# Patient Record
Sex: MALE | Race: WHITE | NOT HISPANIC OR LATINO | Employment: OTHER | URBAN - METROPOLITAN AREA
[De-identification: names, ages, dates, MRNs, and addresses within clinical notes are randomized per-mention and may not be internally consistent; named-entity substitution may affect disease eponyms.]

---

## 2017-04-17 ENCOUNTER — OFFICE VISIT (OUTPATIENT)
Dept: FAMILY MEDICINE CLINIC | Facility: CLINIC | Age: 67
End: 2017-04-17

## 2017-04-17 VITALS
WEIGHT: 222 LBS | DIASTOLIC BLOOD PRESSURE: 77 MMHG | BODY MASS INDEX: 27.6 KG/M2 | TEMPERATURE: 98.5 F | HEIGHT: 75 IN | SYSTOLIC BLOOD PRESSURE: 123 MMHG | RESPIRATION RATE: 14 BRPM | HEART RATE: 62 BPM

## 2017-04-17 DIAGNOSIS — G89.29 CHRONIC NONINTRACTABLE HEADACHE, UNSPECIFIED HEADACHE TYPE: Primary | ICD-10-CM

## 2017-04-17 DIAGNOSIS — R51.9 CHRONIC NONINTRACTABLE HEADACHE, UNSPECIFIED HEADACHE TYPE: Primary | ICD-10-CM

## 2017-04-17 PROBLEM — F31.9 BIPOLAR 1 DISORDER (HCC): Status: ACTIVE | Noted: 2017-04-17

## 2017-04-17 PROBLEM — K76.0 FATTY LIVER: Status: ACTIVE | Noted: 2017-04-17

## 2017-04-17 PROCEDURE — 99203 OFFICE O/P NEW LOW 30 MIN: CPT | Performed by: FAMILY MEDICINE

## 2017-04-17 RX ORDER — NICOTINE POLACRILEX 4 MG
LOZENGE BUCCAL
COMMUNITY
End: 2017-07-07

## 2017-04-17 RX ORDER — OMEPRAZOLE 20 MG/1
20 CAPSULE, DELAYED RELEASE ORAL DAILY
COMMUNITY

## 2017-04-17 RX ORDER — BUTALBITAL, ACETAMINOPHEN AND CAFFEINE 50; 325; 40 MG/1; MG/1; MG/1
1-2 TABLET ORAL EVERY 6 HOURS PRN
Qty: 60 TABLET | Refills: 0 | Status: SHIPPED | OUTPATIENT
Start: 2017-04-17 | End: 2017-07-07

## 2017-04-17 RX ORDER — LORATADINE 10 MG/1
CAPSULE, LIQUID FILLED ORAL
COMMUNITY

## 2017-04-17 RX ORDER — RISPERIDONE 4 MG/1
4 TABLET ORAL
COMMUNITY
End: 2017-05-26

## 2017-04-17 RX ORDER — KETOCONAZOLE 20 MG/ML
SHAMPOO TOPICAL 2 TIMES WEEKLY
COMMUNITY

## 2017-04-17 RX ORDER — VENLAFAXINE 75 MG/1
75 TABLET ORAL 2 TIMES DAILY
COMMUNITY
End: 2017-05-26

## 2017-04-17 RX ORDER — LITHIUM CARBONATE 450 MG
450 TABLET, EXTENDED RELEASE ORAL 3 TIMES DAILY
COMMUNITY
End: 2020-12-23 | Stop reason: SDUPTHER

## 2017-04-17 NOTE — PROGRESS NOTES
"Subjective   Sebas Rouse is a 66 y.o. male.     CC: Establishment of Care for Headaches    History of Present Illness     Chief Complaint:   Chief Complaint   Patient presents with   • Headache     [facility]   2-3 TIMES A MONTH   - NEW PATIENT       Sebas Rouse 66 y.o. male who presents today to establish care for Medical Management of headaches  he has a history of   Patient Active Problem List   Diagnosis   • Bipolar 1 disorder   • Fatty liver   .  Since the [facility], he has overall felt well.  he has been compliant with   Current Outpatient Prescriptions:   •  butalbital-acetaminophen-caffeine (FIORICET) -40 MG per tablet, Take 1-2 tablets by mouth Every 6 (Six) Hours As Needed for Headache., Disp: 60 tablet, Rfl: 0  •  dextrose (GLUTOSE) 40 % gel, Take  by mouth Every 1 (One) Hour As Needed for Low Blood Sugar., Disp: , Rfl:   •  ketoconazole (NIZORAL) 2 % shampoo, Apply  topically 2 (Two) Times a Week., Disp: , Rfl:   •  lithium (ESKALITH) 450 MG CR tablet, Take 450 mg by mouth 3 (Three) Times a Day., Disp: , Rfl:   •  Loratadine 10 MG capsule, Take  by mouth., Disp: , Rfl:   •  omeprazole (priLOSEC) 20 MG capsule, Take 20 mg by mouth Daily., Disp: , Rfl:   •  risperiDONE (risperDAL) 4 MG tablet, Take 4 mg by mouth. 1/2 QS, Disp: , Rfl:   •  venlafaxine (EFFEXOR) 75 MG tablet, Take 75 mg by mouth 2 (Two) Times a Day., Disp: , Rfl: .  he denies medication side effects.    He gets 2-3 headaches/month for years. Usually responds to Aleve and Motrin w/o needing to escalate further.   All of the chronic condition(s) listed above are stable w/o issues.    /77  Pulse 62  Temp 98.5 °F (36.9 °C) (Oral)   Resp 14  Ht 75\" (190.5 cm)  Wt 222 lb (101 kg)  BMI 27.75 kg/m2    No results found for this or any previous visit.      The following portions of the patient's history were reviewed and updated as appropriate: allergies, current medications, past family history, " past medical history, past social history, past surgical history and problem list.    Review of Systems   Constitutional: Negative for activity change, chills, fatigue and fever.   Respiratory: Negative for cough and chest tightness.    Cardiovascular: Negative for chest pain and palpitations.   Gastrointestinal: Negative for abdominal pain and nausea.   Endocrine: Negative for cold intolerance and polydipsia.   Neurological: Positive for headaches.   Psychiatric/Behavioral: Negative for behavioral problems and dysphoric mood.   All other systems reviewed and are negative.      Objective   Physical Exam   Constitutional: He appears well-developed and well-nourished.   Neck: Neck supple. No thyromegaly present.   Cardiovascular: Normal rate and regular rhythm.    No murmur heard.  Pulmonary/Chest: Effort normal and breath sounds normal.   Abdominal: Bowel sounds are normal.   Psychiatric: He has a normal mood and affect. His behavior is normal.   Nursing note and vitals reviewed.  Old records reviewed with pt.    Assessment/Plan   Sebas was seen today for headache.    Diagnoses and all orders for this visit:    Chronic nonintractable headache, unspecified headache type  -     butalbital-acetaminophen-caffeine (FIORICET) -40 MG per tablet; Take 1-2 tablets by mouth Every 6 (Six) Hours As Needed for Headache.

## 2017-05-22 DIAGNOSIS — R51.9 HEADACHE, UNSPECIFIED HEADACHE TYPE: Primary | ICD-10-CM

## 2017-05-25 ENCOUNTER — OFFICE VISIT (OUTPATIENT)
Dept: FAMILY MEDICINE CLINIC | Facility: CLINIC | Age: 67
End: 2017-05-25

## 2017-05-25 VITALS
WEIGHT: 220 LBS | BODY MASS INDEX: 27.35 KG/M2 | HEART RATE: 64 BPM | TEMPERATURE: 97.5 F | SYSTOLIC BLOOD PRESSURE: 122 MMHG | HEIGHT: 75 IN | RESPIRATION RATE: 14 BRPM | DIASTOLIC BLOOD PRESSURE: 78 MMHG

## 2017-05-25 DIAGNOSIS — B37.0 ORAL THRUSH: Primary | ICD-10-CM

## 2017-05-25 PROCEDURE — 99213 OFFICE O/P EST LOW 20 MIN: CPT | Performed by: FAMILY MEDICINE

## 2017-05-25 RX ORDER — FLUCONAZOLE 200 MG/1
200 TABLET ORAL ONCE
Qty: 1 TABLET | Refills: 0 | Status: SHIPPED | OUTPATIENT
Start: 2017-05-25 | End: 2017-05-25

## 2017-06-20 ENCOUNTER — OFFICE VISIT (OUTPATIENT)
Dept: FAMILY MEDICINE CLINIC | Facility: CLINIC | Age: 67
End: 2017-06-20

## 2017-06-20 VITALS
HEIGHT: 75 IN | BODY MASS INDEX: 26.61 KG/M2 | WEIGHT: 214 LBS | HEART RATE: 68 BPM | TEMPERATURE: 98.3 F | RESPIRATION RATE: 16 BRPM | DIASTOLIC BLOOD PRESSURE: 72 MMHG | SYSTOLIC BLOOD PRESSURE: 122 MMHG

## 2017-06-20 DIAGNOSIS — B37.0 ORAL THRUSH: Primary | ICD-10-CM

## 2017-06-20 PROCEDURE — 99213 OFFICE O/P EST LOW 20 MIN: CPT | Performed by: FAMILY MEDICINE

## 2017-06-20 RX ORDER — FLUCONAZOLE 200 MG/1
TABLET ORAL
Qty: 4 TABLET | Refills: 0 | Status: SHIPPED | OUTPATIENT
Start: 2017-06-20 | End: 2017-07-07

## 2017-06-20 NOTE — PROGRESS NOTES
"Subjective   Sebas Rouse is a 66 y.o. male.     CC: Thrush    History of Present Illness     Pt returns with c/o recurrent thrush in the mouth. Was treated previously yet reports it's now back.  Started back about 4 days ago and is a littl epainful.    The following portions of the patient's history were reviewed and updated as appropriate: allergies, current medications, past family history, past medical history, past social history, past surgical history and problem list.    Review of Systems   Constitutional: Negative for activity change, chills, fatigue and fever.   HENT:        Thrush   Respiratory: Negative for cough and shortness of breath.    Cardiovascular: Negative for chest pain and palpitations.   Gastrointestinal: Negative for abdominal pain.   Endocrine: Negative for cold intolerance.   Psychiatric/Behavioral: Negative for behavioral problems and dysphoric mood. The patient is not nervous/anxious.      /72  Pulse 68  Temp 98.3 °F (36.8 °C) (Oral)   Resp 16  Ht 75\" (190.5 cm)  Wt 214 lb (97.1 kg)  BMI 26.75 kg/m2    Objective   Physical Exam   Constitutional: He appears well-developed and well-nourished.   HENT:   Mild white plaquing noted   Neck: Neck supple. No thyromegaly present.   Cardiovascular: Normal rate and regular rhythm.    No murmur heard.  Pulmonary/Chest: Effort normal and breath sounds normal.   Abdominal: Bowel sounds are normal.   Psychiatric: He has a normal mood and affect. His behavior is normal.   Nursing note and vitals reviewed.      Assessment/Plan   Sebas was seen today for oral thrush.    Diagnoses and all orders for this visit:    Oral thrush  -     nystatin (MYCOSTATIN) 637774 UNIT/ML suspension; Swish and swallow 5 mL 4 (Four) Times a Day.  -     fluconazole (DIFLUCAN) 200 MG tablet; Take 1 tab qweek x 4 weeks             "

## 2017-07-07 ENCOUNTER — OFFICE VISIT (OUTPATIENT)
Dept: NEUROLOGY | Facility: CLINIC | Age: 67
End: 2017-07-07

## 2017-07-07 VITALS
HEIGHT: 75 IN | WEIGHT: 211 LBS | DIASTOLIC BLOOD PRESSURE: 80 MMHG | SYSTOLIC BLOOD PRESSURE: 143 MMHG | BODY MASS INDEX: 26.24 KG/M2

## 2017-07-07 DIAGNOSIS — R51.9 NONINTRACTABLE EPISODIC HEADACHE, UNSPECIFIED HEADACHE TYPE: Primary | ICD-10-CM

## 2017-07-07 PROCEDURE — 99204 OFFICE O/P NEW MOD 45 MIN: CPT | Performed by: PSYCHIATRY & NEUROLOGY

## 2017-07-07 RX ORDER — VENLAFAXINE 75 MG/1
75 TABLET ORAL 2 TIMES DAILY
COMMUNITY
End: 2018-04-06 | Stop reason: ALTCHOICE

## 2017-07-07 RX ORDER — CHLORAL HYDRATE 500 MG
CAPSULE ORAL
COMMUNITY

## 2017-07-07 RX ORDER — TOPIRAMATE 25 MG/1
25 TABLET ORAL NIGHTLY
Qty: 30 TABLET | Refills: 2 | Status: SHIPPED | OUTPATIENT
Start: 2017-07-07 | End: 2017-10-06 | Stop reason: SDUPTHER

## 2017-07-07 NOTE — PROGRESS NOTES
Subjective:     Patient ID: Sebas Rouse is a 67 y.o. male.    History of Present Illness     The patient is a 67-year-old right-handed gentleman who was seen for further evaluation of headaches.  The patient was seen today in consultation per the request of Dr. Gold.  History was also taken from the patient's daughter.  He's had headaches for decades but is been worse over the past few years he has him on Abilify night where they wake him up he is on CPAP.  Headaches are intermittent and severe.  He will go to bed and if he wakes up the morning they're usually much worse he may go to 3 weeks without the headaches are have the headaches frequently over to 3 week period of time.  They're usually around his eyes and bilateral.  His never had a neurodiagnostic studies.  He may lay down with the headaches.  He's been trying Fioricet unsuccessfully.  He also uses ibuprofen.  Reviewed his medication.  The following portions of the patient's history were reviewed and updated as appropriate: allergies, current medications, past family history, past medical history, past social history, past surgical history and problem list.     Family History   Problem Relation Age of Onset   • Heart disease Other    • Hypertension Other    • Hyperlipidemia Other    • Leukemia Other    • Depression Other    • COPD Other    • Diabetes Other    • Migraines Mother      Active Ambulatory Problems     Diagnosis Date Noted   • Bipolar 1 disorder 04/17/2017   • Fatty liver 04/17/2017   • Nonintractable episodic headache 07/07/2017     Resolved Ambulatory Problems     Diagnosis Date Noted   • No Resolved Ambulatory Problems     Past Medical History:   Diagnosis Date   • Depression    • GERD (gastroesophageal reflux disease)    • H/O complete eye exam 10/2016   • Headache    • Headache, tension-type    • Migraine    • Panic attack    • TB (pulmonary tuberculosis)      Social History     Social History   • Marital status: Single     Spouse name:  N/A   • Number of children: N/A   • Years of education: N/A     Occupational History   • Not on file.     Social History Main Topics   • Smoking status: Former Smoker   • Smokeless tobacco: Never Used   • Alcohol use No   • Drug use: Defer   • Sexual activity: Not on file     Other Topics Concern   • Not on file     Social History Narrative       Current Outpatient Prescriptions:   •  diclofenac (VOLTAREN) 1 % gel gel, Apply 4 g topically 4 (Four) Times a Day As Needed., Disp: , Rfl:   •  ketoconazole (NIZORAL) 2 % shampoo, Apply  topically 2 (Two) Times a Week., Disp: , Rfl:   •  lithium (ESKALITH) 450 MG CR tablet, Take 450 mg by mouth 3 (Three) Times a Day., Disp: , Rfl:   •  Loratadine 10 MG capsule, Take  by mouth., Disp: , Rfl:   •  Omega-3 Fatty Acids (FISH OIL) 1000 MG capsule capsule, Take  by mouth 3 (Three) Times a Day With Meals., Disp: , Rfl:   •  omeprazole (priLOSEC) 20 MG capsule, Take 20 mg by mouth Daily., Disp: , Rfl:   •  venlafaxine (EFFEXOR) 75 MG tablet, Take 75 mg by mouth 2 (Two) Times a Day., Disp: , Rfl:       Review of Systems   Constitutional: Negative.    HENT: Positive for dental problem and hearing loss. Negative for congestion, drooling, ear discharge, ear pain, facial swelling, mouth sores, nosebleeds, postnasal drip, rhinorrhea, sinus pressure, sneezing, sore throat, tinnitus, trouble swallowing and voice change.    Eyes: Negative.    Respiratory: Negative.    Cardiovascular: Negative.    Gastrointestinal: Negative.    Endocrine: Negative.    Musculoskeletal: Negative.    Skin: Negative.    Allergic/Immunologic: Negative.    Neurological: Positive for numbness. Negative for dizziness, tremors, seizures, syncope, facial asymmetry, speech difficulty, weakness, light-headedness and headaches.   Hematological: Negative.    Psychiatric/Behavioral: Negative.         Objective:    Neurologic Exam  Mental status examination showed normal orientation, memory, and speech.  Attention span and  concentration were normal.  Fund of knowledge was normal.  Funduscopic showed no abnormality.  Visual fields were full.  Pupillary reflexes were 5 mm, symmetric, and equally reactive to light.  Eye movements were full and conjugate.  Gag reflex was normal.  Hearing was normal.  Muscles of mastication were normal.  No facial weakness was noted.  Shoulder shrug strength was normal bilaterally.  Tongue protrudes midline.  There is no drift of outstretched arms.  Deep tendon reflexes are 2+ and symmetric.  No focal weakness or atrophy was noted.  Tone was normal in all extremities.  No cerebellar signs were noted.  No abnormal movements were noted.  The patient's gait was normal.  No other pathologic reflexes such as Babinski's sign were noted.  No sensory abnormalities were noted.  Physical Exam    Assessment/Plan:     Sebas was seen today for headache.    Diagnoses and all orders for this visit:    Nonintractable episodic headache, unspecified headache type  -     MRI Brain With & Without Contrast; Future         Etiology of the headache is unclear.  This could be a migraine equivalent although he is a bit old for this he has had the headaches however for a number of years neurologic examination is normal.  However in view of the severity and frequency of the headaches I feel that referable study is warranted.  I set up an MRI of the brain.  In addition I placed him on topiramate 25 mg at night.  He will call in one month.  I plan to see him back the office in 3 months.Thank you for allowing me to share in the care of this patient.  Ozzy Smith M.D.

## 2017-07-18 ENCOUNTER — HOSPITAL ENCOUNTER (OUTPATIENT)
Dept: MRI IMAGING | Facility: HOSPITAL | Age: 67
Discharge: HOME | End: 2017-07-18
Attending: PSYCHIATRY & NEUROLOGY | Admitting: PSYCHIATRY & NEUROLOGY

## 2017-07-18 DIAGNOSIS — R51.9 NONINTRACTABLE EPISODIC HEADACHE, UNSPECIFIED HEADACHE TYPE: ICD-10-CM

## 2017-07-18 LAB — CREAT BLDA-MCNC: 1.2 MG/DL (ref 0.6–1.3)

## 2017-07-18 PROCEDURE — A9577 INJ MULTIHANCE: HCPCS | Performed by: PSYCHIATRY & NEUROLOGY

## 2017-07-18 PROCEDURE — 82565 ASSAY OF CREATININE: CPT

## 2017-07-18 PROCEDURE — 0 GADOBENATE DIMEGLUMINE 529 MG/ML SOLUTION: Performed by: PSYCHIATRY & NEUROLOGY

## 2017-07-18 PROCEDURE — 70553 MRI BRAIN STEM W/O & W/DYE: CPT

## 2017-07-18 RX ADMIN — GADOBENATE DIMEGLUMINE 20 ML: 529 INJECTION, SOLUTION INTRAVENOUS at 11:30

## 2017-07-24 DIAGNOSIS — K11.1 PAROTID HYPERTROPHY: Primary | ICD-10-CM

## 2017-07-31 ENCOUNTER — HOSPITAL ENCOUNTER (OUTPATIENT)
Dept: CT IMAGING | Facility: HOSPITAL | Age: 67
Discharge: HOME | End: 2017-07-31
Attending: PSYCHIATRY & NEUROLOGY | Admitting: PSYCHIATRY & NEUROLOGY

## 2017-07-31 DIAGNOSIS — K11.1 PAROTID HYPERTROPHY: ICD-10-CM

## 2017-07-31 LAB — CREAT BLDA-MCNC: 1.2 MG/DL (ref 0.6–1.3)

## 2017-07-31 PROCEDURE — 82565 ASSAY OF CREATININE: CPT

## 2017-07-31 PROCEDURE — 0 IOPAMIDOL 61 % SOLUTION: Performed by: PSYCHIATRY & NEUROLOGY

## 2017-07-31 PROCEDURE — 70492 CT SFT TSUE NCK W/O & W/DYE: CPT

## 2017-07-31 RX ADMIN — IOPAMIDOL 85 ML: 612 INJECTION, SOLUTION INTRAVENOUS at 07:22

## 2017-08-02 ENCOUNTER — TELEPHONE (OUTPATIENT)
Dept: NEUROLOGY | Facility: CLINIC | Age: 67
End: 2017-08-02

## 2017-08-02 NOTE — TELEPHONE ENCOUNTER
----- Message from Katie Waldrop sent at 8/2/2017 10:35 AM EDT -----  Regarding: FW: Non-Urgent Medical Question  Contact: [phone]      ----- Message -----     From: Sebas Rouse     Sent: 7/8/2017   9:05 PM       To: [facility]  Subject: Non-Urgent Medical Question                      On June 22 I experienced memory loss for a number of hours, I didn't know where I was or anything.  My daughter took me to the hospital and they did a ct scan and kept me overnight.  They could not explain the memory loss and said I didn't have a stroke.  I eventually regained memory of where I lived etc. but not all.      Today I went to a Massage therapist for numb fingers, he checked all nerves and said something else is wrong.  None of the nerves responded as they should have.

## 2017-08-10 DIAGNOSIS — R22.1 NECK MASS: Primary | ICD-10-CM

## 2017-09-13 ENCOUNTER — HOSPITAL ENCOUNTER (OUTPATIENT)
Dept: ONCOLOGY | Facility: CLINIC | Age: 67
Setting detail: INFUSION SERIES
Discharge: HOME | End: 2017-09-13
Attending: INTERNAL MEDICINE | Admitting: INTERNAL MEDICINE

## 2017-09-13 ENCOUNTER — CLINICAL SUPPORT (OUTPATIENT)
Dept: ONCOLOGY | Facility: HOSPITAL | Age: 67
End: 2017-09-13

## 2017-09-13 ENCOUNTER — HOSPITAL ENCOUNTER (OUTPATIENT)
Dept: ONCOLOGY | Facility: HOSPITAL | Age: 67
Discharge: HOME | End: 2017-09-13
Attending: INTERNAL MEDICINE | Admitting: INTERNAL MEDICINE

## 2017-09-13 LAB
ALBUMIN SERPL-MCNC: 4 G/DL (ref 3.5–4.8)
ALBUMIN/GLOB SERPL: 1.3 {RATIO} (ref 1–1.7)
ALP SERPL-CCNC: 56 IU/L (ref 32–91)
ALT SERPL-CCNC: 18 IU/L (ref 17–63)
ANION GAP SERPL CALC-SCNC: 9.9 MMOL/L (ref 10–20)
AST SERPL-CCNC: 19 IU/L (ref 15–41)
BILIRUB SERPL-MCNC: 0.8 MG/DL (ref 0.3–1.2)
BUN SERPL-MCNC: 13 MG/DL (ref 8–20)
BUN/CREAT SERPL: 13 (ref 6.2–20.3)
CALCIUM SERPL-MCNC: 9.1 MG/DL (ref 8.9–10.3)
CHLORIDE SERPL-SCNC: 110 MMOL/L (ref 101–111)
CONV CO2: 24 MMOL/L (ref 22–32)
CONV TOTAL PROTEIN: 7.1 G/DL (ref 6.1–7.9)
CREAT UR-MCNC: 1 MG/DL (ref 0.7–1.2)
GLOBULIN UR ELPH-MCNC: 3.1 G/DL (ref 2.5–3.8)
GLUCOSE SERPL-MCNC: 97 MG/DL (ref 65–99)
LDH SERPL-CCNC: 126 IU/L (ref 98–192)
POTASSIUM SERPL-SCNC: 3.9 MMOL/L (ref 3.6–5.1)
SODIUM SERPL-SCNC: 140 MMOL/L (ref 136–144)

## 2017-09-13 NOTE — PROGRESS NOTES
PATIENTS ONCOLOGY RECORD LOCATED IN [facility]      Subjective     Name:  NEVA GARCIA     Date:  2017  Address:  [address] [place] [address]  Home: [phone]  :  1950 AGE:  67 y.o.        RECORDS OBTAINED:  Patients Oncology Record is located in [facility]

## 2017-09-16 DIAGNOSIS — E06.3 HASHIMOTO'S THYROIDITIS: Primary | ICD-10-CM

## 2017-09-21 ENCOUNTER — HOSPITAL ENCOUNTER (OUTPATIENT)
Dept: ONCOLOGY | Facility: HOSPITAL | Age: 67
Discharge: HOME | End: 2017-09-21
Attending: INTERNAL MEDICINE | Admitting: INTERNAL MEDICINE

## 2017-10-02 ENCOUNTER — CLINICAL SUPPORT (OUTPATIENT)
Dept: ONCOLOGY | Facility: HOSPITAL | Age: 67
End: 2017-10-02

## 2017-10-02 ENCOUNTER — HOSPITAL ENCOUNTER (OUTPATIENT)
Dept: ONCOLOGY | Facility: CLINIC | Age: 67
Setting detail: INFUSION SERIES
Discharge: HOME | End: 2017-10-02
Attending: INTERNAL MEDICINE | Admitting: INTERNAL MEDICINE

## 2017-10-02 ENCOUNTER — HOSPITAL ENCOUNTER (OUTPATIENT)
Dept: ONCOLOGY | Facility: HOSPITAL | Age: 67
Discharge: HOME | End: 2017-10-02
Attending: INTERNAL MEDICINE | Admitting: INTERNAL MEDICINE

## 2017-10-02 NOTE — PROGRESS NOTES
PATIENTS ONCOLOGY RECORD LOCATED IN [facility]      Subjective     Name:  NEVA GARCIA     Date:  10/02/2017  Address:  [address] [place] [address]  Home: [phone]  :  1950 AGE:  67 y.o.        RECORDS OBTAINED:  Patients Oncology Record is located in [facility]

## 2017-10-06 ENCOUNTER — OFFICE VISIT (OUTPATIENT)
Dept: NEUROLOGY | Facility: CLINIC | Age: 67
End: 2017-10-06

## 2017-10-06 VITALS
DIASTOLIC BLOOD PRESSURE: 70 MMHG | WEIGHT: 207 LBS | SYSTOLIC BLOOD PRESSURE: 102 MMHG | BODY MASS INDEX: 25.74 KG/M2 | HEIGHT: 75 IN

## 2017-10-06 DIAGNOSIS — G56.22 ULNAR NERVE COMPRESSION, LEFT: Primary | ICD-10-CM

## 2017-10-06 DIAGNOSIS — R51.9 NONINTRACTABLE EPISODIC HEADACHE, UNSPECIFIED HEADACHE TYPE: ICD-10-CM

## 2017-10-06 PROCEDURE — 99214 OFFICE O/P EST MOD 30 MIN: CPT | Performed by: PSYCHIATRY & NEUROLOGY

## 2017-10-06 RX ORDER — TOPIRAMATE 50 MG/1
50 TABLET, FILM COATED ORAL NIGHTLY
Qty: 30 TABLET | Refills: 11 | Status: SHIPPED | OUTPATIENT
Start: 2017-10-06 | End: 2018-04-06 | Stop reason: SDUPTHER

## 2017-10-06 RX ORDER — ENALAPRIL MALEATE 20 MG/1
20 TABLET ORAL DAILY
COMMUNITY
End: 2017-10-06

## 2017-10-06 RX ORDER — BUTALBITAL, ACETAMINOPHEN, CAFFEINE AND CODEINE PHOSPHATE 50; 325; 40; 30 MG/1; MG/1; MG/1; MG/1
1 CAPSULE ORAL EVERY 4 HOURS PRN
Qty: 30 CAPSULE | Refills: 1 | Status: SHIPPED | OUTPATIENT
Start: 2017-10-06 | End: 2018-04-06 | Stop reason: SDUPTHER

## 2017-10-06 NOTE — PROGRESS NOTES
Subjective:     Patient ID: Sebas Rouse is a 67 y.o. male.    History of Present Illness     Patient was seen back for follow-up of headaches.  He also has a new problem of numbness in his left hand.  This involves the ring and small finger.  This is been going on for a few months and has some weakness in the hand accompanying this.  He has not had any tests for this.  His headaches have improved but they are still problem.  He is tolerating Topamax 25 mg at night.  Butalbital work better than ibuprofen with the headaches would not relent.  History was also taken from the patient's daughter.  The following portions of the patient's history were reviewed and updated as appropriate: allergies, current medications, past family history, past medical history, past social history, past surgical history and problem list.      Current Outpatient Prescriptions:   •  diclofenac (VOLTAREN) 1 % gel gel, Apply 4 g topically 4 (Four) Times a Day As Needed., Disp: , Rfl:   •  ketoconazole (NIZORAL) 2 % shampoo, Apply  topically 2 (Two) Times a Week., Disp: , Rfl:   •  lithium (ESKALITH) 450 MG CR tablet, Take 450 mg by mouth 3 (Three) Times a Day., Disp: , Rfl:   •  Loratadine 10 MG capsule, Take  by mouth., Disp: , Rfl:   •  Omega-3 Fatty Acids (FISH OIL) 1000 MG capsule capsule, Take  by mouth 3 (Three) Times a Day With Meals., Disp: , Rfl:   •  omeprazole (priLOSEC) 20 MG capsule, Take 20 mg by mouth Daily., Disp: , Rfl:   •  rivaroxaban (XARELTO) 20 MG tablet, Take 20 mg by mouth Daily., Disp: , Rfl:   •  topiramate (TOPAMAX) 50 MG tablet, Take 1 tablet by mouth Every Night., Disp: 30 tablet, Rfl: 11  •  venlafaxine (EFFEXOR) 75 MG tablet, Take 75 mg by mouth 2 (Two) Times a Day., Disp: , Rfl:   •  butalbital-acetaminophen-caffeine-codeine (FIORICET/CODEINE) -44-30 MG per capsule, Take 1 capsule by mouth Every 4 (Four) Hours As Needed for Headache., Disp: 30 capsule, Rfl: 1    Review of Systems   Constitutional:  Negative.    Neurological: Positive for numbness and headaches. Negative for dizziness, tremors, seizures, syncope, facial asymmetry, speech difficulty, weakness and light-headedness.   Psychiatric/Behavioral: Negative.         Objective:    Neurologic Exam  Mental status was appropriate for age.  Fundoscopy showed no papilledema. Visual fields were full to OKN.  Eye movements were full and conjugate.  Pupillary reflexes were mid-range and symmetric.  No facial weakness was noted.  Tongue was midline.  There was no pattern of focal weakness.  Gait was appropriate for age.  No pathologic reflexes were noted.  No cerebellar signs were noted.  Tone was normal.  Deep tendon reflexes were 2+ and symmetric.  There was no atrophy in the ulnar distribution.  Physical Exam    Assessment/Plan:     Sebas was seen today for headache.    Diagnoses and all orders for this visit:    Ulnar nerve compression, left  -     EMG; Future    Nonintractable episodic headache, unspecified headache type    Other orders  -     topiramate (TOPAMAX) 50 MG tablet; Take 1 tablet by mouth Every Night.  -     butalbital-acetaminophen-caffeine-codeine (FIORICET/CODEINE) -49-30 MG per capsule; Take 1 capsule by mouth Every 4 (Four) Hours As Needed for Headache.       He likely has ulnar nerve compression at the elbow on the left.  I set up EMG and nerve conduction studies to be done at [facility].  His headaches are also not totally controlled.  Therefore I'll increase Topamax and restart him on sparing doses of Fioricet    Prescription drug management - meds as above.    Follow-up in the office in 6 months.  Will talk with him at the time of the EMG.  Thank you for allowing me to share in the care of this patient.  Ozzy Smith M.D.

## 2017-11-03 ENCOUNTER — HOSPITAL ENCOUNTER (OUTPATIENT)
Dept: NEUROLOGY | Facility: HOSPITAL | Age: 67
Discharge: HOME | End: 2017-11-03
Attending: PSYCHIATRY & NEUROLOGY | Admitting: PSYCHIATRY & NEUROLOGY

## 2017-11-03 PROCEDURE — 95908 NRV CNDJ TST 3-4 STUDIES: CPT | Performed by: PSYCHIATRY & NEUROLOGY

## 2017-11-06 ENCOUNTER — TELEPHONE (OUTPATIENT)
Dept: NEUROLOGY | Facility: CLINIC | Age: 67
End: 2017-11-06

## 2017-11-06 ENCOUNTER — OUTSIDE FACILITY SERVICE (OUTPATIENT)
Dept: NEUROLOGY | Facility: CLINIC | Age: 67
End: 2017-11-06

## 2017-11-06 NOTE — TELEPHONE ENCOUNTER
"----- Message from Grzegorz Walters sent at 11/6/2017  9:38 AM EST -----  Contact: [phone]  Pt called wanting his test results from being \"shocked\" I am assuming it was an EMG? He stated that Dr Smith told him he would call him back and the pt is still waiting.  Please Advice.   "

## 2017-11-06 NOTE — TELEPHONE ENCOUNTER
EMG was done Friday.  I asked the patient to call me back on Monday for the next step.  I recommend that he be seen by a hand surgeon for the problem with the ulnar nerve at his elbow.  Would recommend [facility]

## 2018-04-06 ENCOUNTER — OFFICE VISIT (OUTPATIENT)
Dept: NEUROLOGY | Facility: CLINIC | Age: 68
End: 2018-04-06

## 2018-04-06 VITALS
BODY MASS INDEX: 27.98 KG/M2 | WEIGHT: 225 LBS | DIASTOLIC BLOOD PRESSURE: 80 MMHG | SYSTOLIC BLOOD PRESSURE: 120 MMHG | HEIGHT: 75 IN

## 2018-04-06 DIAGNOSIS — G56.22 ULNAR NERVE COMPRESSION, LEFT: ICD-10-CM

## 2018-04-06 DIAGNOSIS — R51.9 NONINTRACTABLE EPISODIC HEADACHE, UNSPECIFIED HEADACHE TYPE: Primary | ICD-10-CM

## 2018-04-06 PROCEDURE — 99213 OFFICE O/P EST LOW 20 MIN: CPT | Performed by: PSYCHIATRY & NEUROLOGY

## 2018-04-06 RX ORDER — BUPROPION HYDROCHLORIDE 150 MG/1
300 TABLET ORAL
COMMUNITY
Start: 2018-01-24 | End: 2020-12-23 | Stop reason: SDUPTHER

## 2018-04-06 RX ORDER — TRIAMCINOLONE ACETONIDE 1 MG/G
CREAM TOPICAL
COMMUNITY
Start: 2018-02-11

## 2018-04-06 RX ORDER — TOPIRAMATE 50 MG/1
50 TABLET, FILM COATED ORAL NIGHTLY
Qty: 30 TABLET | Refills: 11 | Status: SHIPPED | OUTPATIENT
Start: 2018-04-06 | End: 2018-10-05 | Stop reason: SDUPTHER

## 2018-04-06 RX ORDER — BUTALBITAL, ACETAMINOPHEN, CAFFEINE AND CODEINE PHOSPHATE 50; 325; 40; 30 MG/1; MG/1; MG/1; MG/1
1 CAPSULE ORAL EVERY 4 HOURS PRN
Qty: 30 CAPSULE | Refills: 1 | Status: SHIPPED | OUTPATIENT
Start: 2018-04-06 | End: 2018-10-05 | Stop reason: SDUPTHER

## 2018-04-06 RX ORDER — RISPERIDONE 2 MG/1
TABLET ORAL
COMMUNITY
Start: 2018-03-08 | End: 2020-12-23 | Stop reason: SDUPTHER

## 2018-04-06 NOTE — PROGRESS NOTES
Subjective:     Patient ID: Sebas Rouse is a 67 y.o. male.    History of Present Illness     The patient's headaches are fairly well controlled.  He is tolerating the medicine well and is using Fioricet with codeine sparingly.  He did not wish to have anything done about the ulnar neuropathy at the elbow on the left.  He may be losing some strength.  He asked about massage therapy.  The following portions of the patient's history were reviewed and updated as appropriate: allergies, current medications, past family history, past medical history, past social history, past surgical history and problem list.      Current Outpatient Prescriptions:   •  buPROPion XL (WELLBUTRIN XL) 150 MG 24 hr tablet, , Disp: , Rfl:   •  butalbital-acetaminophen-caffeine-codeine (FIORICET/CODEINE) -98-30 MG per capsule, Take 1 capsule by mouth Every 4 (Four) Hours As Needed for Headache., Disp: 30 capsule, Rfl: 1  •  diclofenac (VOLTAREN) 1 % gel gel, Apply 4 g topically 4 (Four) Times a Day As Needed., Disp: , Rfl:   •  ketoconazole (NIZORAL) 2 % shampoo, Apply  topically 2 (Two) Times a Week., Disp: , Rfl:   •  lithium (ESKALITH) 450 MG CR tablet, Take 450 mg by mouth 3 (Three) Times a Day., Disp: , Rfl:   •  Loratadine 10 MG capsule, Take  by mouth., Disp: , Rfl:   •  Omega-3 Fatty Acids (FISH OIL) 1000 MG capsule capsule, Take  by mouth 3 (Three) Times a Day With Meals., Disp: , Rfl:   •  omeprazole (priLOSEC) 20 MG capsule, Take 20 mg by mouth Daily., Disp: , Rfl:   •  risperiDONE (RISPERDAL) 2 MG tablet, , Disp: , Rfl:   •  rivaroxaban (XARELTO) 20 MG tablet, Take 20 mg by mouth Daily., Disp: , Rfl:   •  topiramate (TOPAMAX) 50 MG tablet, Take 1 tablet by mouth Every Night., Disp: 30 tablet, Rfl: 11  •  triamcinolone (KENALOG) 0.1 % cream, , Disp: , Rfl:     Review of Systems   Constitutional: Negative.    Neurological: Positive for headaches. Negative for dizziness, tremors, seizures, syncope, facial asymmetry, speech  difficulty, weakness, light-headedness and numbness.   Psychiatric/Behavioral: Negative.         Objective:    Neurologic Exam  Mental status examination was appropriate.  Funduscopy, visual fields, eye movements and pupillary reflexes were normal.  No facial weakness was noted.  Gait was normal.  No pattern of focal weakness was noted.  Physical Exam    Assessment/Plan:     Sebas was seen today for headache.    Diagnoses and all orders for this visit:    Nonintractable episodic headache, unspecified headache type    Ulnar nerve compression, left    Other orders  -     topiramate (TOPAMAX) 50 MG tablet; Take 1 tablet by mouth Every Night.  -     butalbital-acetaminophen-caffeine-codeine (FIORICET/CODEINE) -85-30 MG per capsule; Take 1 capsule by mouth Every 4 (Four) Hours As Needed for Headache.         Continue current treatment for headache.  I think he should consider seeing a hand surgeon for his ulnar neuropathy but he is reluctant.    Prescription drug management - meds as above    Follow-up in the office in six months. Thank you for allowing me to share in the care of this patient.  Ozzy Smith M.D.

## 2018-08-13 ENCOUNTER — OFFICE VISIT (OUTPATIENT)
Dept: FAMILY MEDICINE CLINIC | Facility: CLINIC | Age: 68
End: 2018-08-13

## 2018-08-13 VITALS
SYSTOLIC BLOOD PRESSURE: 116 MMHG | DIASTOLIC BLOOD PRESSURE: 71 MMHG | BODY MASS INDEX: 26.11 KG/M2 | WEIGHT: 210 LBS | RESPIRATION RATE: 16 BRPM | HEIGHT: 75 IN | HEART RATE: 64 BPM | TEMPERATURE: 98.2 F

## 2018-08-13 DIAGNOSIS — Z00.00 MEDICARE ANNUAL WELLNESS VISIT, SUBSEQUENT: Primary | ICD-10-CM

## 2018-08-13 PROCEDURE — G0439 PPPS, SUBSEQ VISIT: HCPCS | Performed by: FAMILY MEDICINE

## 2018-08-13 NOTE — PATIENT INSTRUCTIONS
Medicare Wellness  Personal Prevention Plan of Service     Date of Office Visit:  2018  Encounter Provider:  Ozzy Gold MD  Place of Service:  [facility]  Patient Name: Sebas Rouse  :  1950    As part of the Medicare Wellness portion of your visit today, we are providing you with this personalized preventive plan of services (PPPS). This plan is based upon recommendations of the [place] Preventive Services Task Force (USPSTF) and the Advisory Committee on Immunization Practices (ACIP).    This lists the preventive care services that should be considered, and provides dates of when you are due. Items listed as completed are up-to-date and do not require any further intervention.    Health Maintenance   Topic Date Due   • AAA SCREEN (ONE-TIME)  2017   • INFLUENZA VACCINE  2018   • ZOSTER VACCINE (2 of 2) 2019 (Originally 2014)   • TDAP/TD VACCINES (1 - Tdap) 2019 (Originally 1969)   • MEDICARE ANNUAL WELLNESS  2019   • COLONOSCOPY  2025   • HEPATITIS C SCREENING  Excluded   • PNEUMOCOCCAL VACCINES (65+ LOW/MEDIUM RISK)  Excluded       No orders of the defined types were placed in this encounter.      Return in about 1 year (around 2019) for Annual physical.

## 2018-08-13 NOTE — PROGRESS NOTES
QUICK REFERENCE INFORMATION:  The ABCs of the Annual Wellness Visit    Subsequent Medicare Wellness Visit    HEALTH RISK ASSESSMENT    1950    Recent Hospitalizations:  No hospitalization(s) within the last year..        Current Medical Providers:  Patient Care Team:  Ozzy Gold MD as PCP - General (Family Medicine)  Ozzy Smith Jr., MD as PCP - Claims Attributed  Ozzy Smith Jr., MD as Consulting Physician (Neurology)  Leyda Rollins MD (Psychiatry)        Smoking Status:  History   Smoking Status   • Former Smoker   • Packs/day: 4.00   • Years: 35.00   • Start date: 1/1/1966   • Quit date: 1/1/2002   Smokeless Tobacco   • Never Used     Comment: didn't enjoy it       Alcohol Consumption:  History   Alcohol Use No       Depression Screen:   PHQ-2/PHQ-9 Depression Screening 8/13/2018   Little interest or pleasure in doing things 0   Feeling down, depressed, or hopeless 0   Total Score 0       Health Habits and Functional and Cognitive Screening:  Functional & Cognitive Status 8/13/2018   Do you have difficulty preparing food and eating? No   Do you have difficulty bathing yourself, getting dressed or grooming yourself? No   Do you have difficulty using the toilet? No   Do you have difficulty moving around from place to place? No   Do you have trouble with steps or getting out of a bed or a chair? No   In the past year have you fallen or experienced a near fall? No   Current Diet Well Balanced Diet   Dental Exam Up to date   Eye Exam Up to date   Exercise (times per week) 2 times per week   Current Exercise Activities Include Walking   Do you need help using the phone?  No   Are you deaf or do you have serious difficulty hearing?  No   Do you need help with transportation? No   Do you need help shopping? No   Do you need help preparing meals?  No   Do you need help with housework?  No   Do you need help with laundry? No   Do you need help taking your medications? No   Do you need help managing  money? No   Do you ever drive or ride in a car without wearing a seat belt? No   Have you felt unusual stress, anger or loneliness in the last month? No   Who do you live with? Spouse   If you need help, do you have trouble finding someone available to you? Yes   Have you been bothered in the last four weeks by sexual problems? No   Do you have difficulty concentrating, remembering or making decisions? No           Does the patient have evidence of cognitive impairment? No    Aspirin use counseling: Contraindicated from taking ASA      Recent Lab Results:  CMP:  Lab Results   Component Value Date    CREATININE 1.20 07/31/2017     Lipid Panel:     HbA1c:       Visual Acuity:  No exam data present    Age-appropriate Screening Schedule:  Refer to the list below for future screening recommendations based on patient's age, sex and/or medical conditions. Orders for these recommended tests are listed in the plan section. The patient has been provided with a written plan.    Health Maintenance   Topic Date Due   • INFLUENZA VACCINE  08/01/2018   • ZOSTER VACCINE (2 of 2) 01/23/2019 (Originally 2/26/2014)   • TDAP/TD VACCINES (1 - Tdap) 02/20/2019 (Originally 7/7/1969)   • COLONOSCOPY  01/13/2025   • PNEUMOCOCCAL VACCINES (65+ LOW/MEDIUM RISK)  Excluded        Subjective   History of Present Illness    Sebas Rouse is a 68 y.o. male who presents for an Subsequent Wellness Visit.    The following portions of the patient's history were reviewed and updated as appropriate: allergies, current medications, past family history, past medical history, past social history, past surgical history and problem list.    Outpatient Medications Prior to Visit   Medication Sig Dispense Refill   • buPROPion XL (WELLBUTRIN XL) 150 MG 24 hr tablet      • butalbital-acetaminophen-caffeine-codeine (FIORICET/CODEINE) -53-30 MG per capsule Take 1 capsule by mouth Every 4 (Four) Hours As Needed for Headache. 30 capsule 1   • diclofenac  "(VOLTAREN) 1 % gel gel Apply 4 g topically 4 (Four) Times a Day As Needed.     • ketoconazole (NIZORAL) 2 % shampoo Apply  topically 2 (Two) Times a Week.     • lithium (ESKALITH) 450 MG CR tablet Take 450 mg by mouth 3 (Three) Times a Day.     • Loratadine 10 MG capsule Take  by mouth.     • Omega-3 Fatty Acids (FISH OIL) 1000 MG capsule capsule Take  by mouth 3 (Three) Times a Day With Meals.     • omeprazole (priLOSEC) 20 MG capsule Take 20 mg by mouth Daily.     • risperiDONE (RISPERDAL) 2 MG tablet      • rivaroxaban (XARELTO) 20 MG tablet Take 20 mg by mouth Daily.     • topiramate (TOPAMAX) 50 MG tablet Take 1 tablet by mouth Every Night. 30 tablet 11   • triamcinolone (KENALOG) 0.1 % cream        No facility-administered medications prior to visit.        Patient Active Problem List   Diagnosis   • Bipolar 1 disorder (CMS/HCC)   • Fatty liver   • Nonintractable episodic headache   • Ulnar nerve compression, left       Advance Care Planning:  has NO advance directive - information provided to the patient today    Identification of Risk Factors:  Risk factors include: cardiovascular risk.    Review of Systems    Compared to one year ago, the patient feels his physical health is the same.  Compared to one year ago, the patient feels his mental health is the same.    Objective     Physical Exam    Vitals:    08/13/18 1014   BP: 116/71   Pulse: 64   Resp: 16   Temp: 98.2 °F (36.8 °C)   TempSrc: Oral   Weight: 95.3 kg (210 lb)   Height: 190.5 cm (75\")   PainSc: 8  Comment: headaches       Patient's Body mass index is 26.25 kg/m². BMI is within normal parameters. No follow-up required.      Assessment/Plan   Patient Self-Management and Personalized Health Advice  The patient has been provided with information about: diet, exercise and weight management and preventive services including:   · Exercise counseling provided, Fall Risk assessment done, Nutrition counseling provided.    Visit Diagnoses:    ICD-10-CM " ICD-9-CM   1. Medicare annual wellness visit, subsequent Z00.00 V70.0       No orders of the defined types were placed in this encounter.      Outpatient Encounter Prescriptions as of 8/13/2018   Medication Sig Dispense Refill   • buPROPion XL (WELLBUTRIN XL) 150 MG 24 hr tablet      • butalbital-acetaminophen-caffeine-codeine (FIORICET/CODEINE) -40-30 MG per capsule Take 1 capsule by mouth Every 4 (Four) Hours As Needed for Headache. 30 capsule 1   • diclofenac (VOLTAREN) 1 % gel gel Apply 4 g topically 4 (Four) Times a Day As Needed.     • ketoconazole (NIZORAL) 2 % shampoo Apply  topically 2 (Two) Times a Week.     • lithium (ESKALITH) 450 MG CR tablet Take 450 mg by mouth 3 (Three) Times a Day.     • Loratadine 10 MG capsule Take  by mouth.     • Omega-3 Fatty Acids (FISH OIL) 1000 MG capsule capsule Take  by mouth 3 (Three) Times a Day With Meals.     • omeprazole (priLOSEC) 20 MG capsule Take 20 mg by mouth Daily.     • risperiDONE (RISPERDAL) 2 MG tablet      • rivaroxaban (XARELTO) 20 MG tablet Take 20 mg by mouth Daily.     • topiramate (TOPAMAX) 50 MG tablet Take 1 tablet by mouth Every Night. 30 tablet 11   • triamcinolone (KENALOG) 0.1 % cream        No facility-administered encounter medications on file as of 8/13/2018.        Reviewed use of high risk medication in the elderly: not applicable  Reviewed for potential of harmful drug interactions in the elderly: not applicable    Follow Up:  Return in about 1 year (around 8/13/2019) for Annual physical.     An After Visit Summary and PPPS with all of these plans were given to the patient.

## 2018-10-05 ENCOUNTER — OFFICE VISIT (OUTPATIENT)
Dept: NEUROLOGY | Facility: CLINIC | Age: 68
End: 2018-10-05

## 2018-10-05 VITALS
BODY MASS INDEX: 25.74 KG/M2 | DIASTOLIC BLOOD PRESSURE: 60 MMHG | SYSTOLIC BLOOD PRESSURE: 120 MMHG | WEIGHT: 207 LBS | HEIGHT: 75 IN

## 2018-10-05 DIAGNOSIS — R51.9 NONINTRACTABLE EPISODIC HEADACHE, UNSPECIFIED HEADACHE TYPE: Primary | ICD-10-CM

## 2018-10-05 PROCEDURE — 99213 OFFICE O/P EST LOW 20 MIN: CPT | Performed by: PSYCHIATRY & NEUROLOGY

## 2018-10-05 RX ORDER — TOPIRAMATE 50 MG/1
50 TABLET, FILM COATED ORAL NIGHTLY
Qty: 30 TABLET | Refills: 11 | Status: SHIPPED | OUTPATIENT
Start: 2018-10-05 | End: 2019-10-05

## 2018-10-05 RX ORDER — BUTALBITAL, ACETAMINOPHEN, CAFFEINE AND CODEINE PHOSPHATE 50; 325; 40; 30 MG/1; MG/1; MG/1; MG/1
1 CAPSULE ORAL EVERY 4 HOURS PRN
Qty: 30 CAPSULE | Refills: 5 | Status: SHIPPED | OUTPATIENT
Start: 2018-10-05

## 2018-10-05 RX ORDER — TESTOSTERONE CYPIONATE 200 MG/ML
INJECTION, SOLUTION INTRAMUSCULAR
COMMUNITY
Start: 2018-09-17

## 2018-10-05 RX ORDER — LORAZEPAM 1 MG/1
TABLET ORAL
COMMUNITY
Start: 2018-09-26 | End: 2020-12-23 | Stop reason: SDUPTHER

## 2018-10-05 NOTE — PROGRESS NOTES
Subjective:     Patient ID: Sebas Rouse is a 68 y.o. male.    History of Present Illness    The patient's headaches are fairly well controlled.  History was also taken from the patient's daughter.  He is on Topamax 50 mg at night without side effects which seems to be helping.  He takes Fioricet with codeine to abort the headaches he is taking less than 30 a month.  No side effects from this.    The following portions of the patient's history were reviewed and updated as appropriate: allergies, current medications, past family history, past medical history, past social history, past surgical history and problem list.      Current Outpatient Prescriptions:   •  buPROPion XL (WELLBUTRIN XL) 150 MG 24 hr tablet, , Disp: , Rfl:   •  butalbital-acetaminophen-caffeine-codeine (FIORICET/CODEINE) -51-30 MG per capsule, Take 1 capsule by mouth Every 4 (Four) Hours As Needed for Headache., Disp: 30 capsule, Rfl: 5  •  diclofenac (VOLTAREN) 1 % gel gel, Apply 4 g topically 4 (Four) Times a Day As Needed., Disp: , Rfl:   •  ketoconazole (NIZORAL) 2 % shampoo, Apply  topically 2 (Two) Times a Week., Disp: , Rfl:   •  lithium (ESKALITH) 450 MG CR tablet, Take 450 mg by mouth 3 (Three) Times a Day., Disp: , Rfl:   •  Loratadine 10 MG capsule, Take  by mouth., Disp: , Rfl:   •  LORazepam (ATIVAN) 1 MG tablet, , Disp: , Rfl:   •  Omega-3 Fatty Acids (FISH OIL) 1000 MG capsule capsule, Take  by mouth 3 (Three) Times a Day With Meals., Disp: , Rfl:   •  omeprazole (priLOSEC) 20 MG capsule, Take 20 mg by mouth Daily., Disp: , Rfl:   •  risperiDONE (RISPERDAL) 2 MG tablet, , Disp: , Rfl:   •  rivaroxaban (XARELTO) 20 MG tablet, Take 20 mg by mouth Daily., Disp: , Rfl:   •  Testosterone Cypionate (DEPOTESTOTERONE CYPIONATE) 200 MG/ML injection, , Disp: , Rfl:   •  topiramate (TOPAMAX) 50 MG tablet, Take 1 tablet by mouth Every Night., Disp: 30 tablet, Rfl: 11  •  triamcinolone (KENALOG) 0.1 % cream, , Disp: , Rfl:     Review of  Systems   Constitutional: Negative.    Neurological: Positive for numbness and headaches. Negative for dizziness, tremors, seizures, syncope, facial asymmetry, speech difficulty, weakness and light-headedness.   Psychiatric/Behavioral: Negative.         Objective:    Neurologic Exam  Mental status examination was appropriate.  Funduscopy, visual fields, eye movements and pupillary reflexes were normal.  No facial weakness was noted.  Gait was normal.  No pattern of focal weakness was noted.  Physical Exam    Assessment/Plan:     Sebas was seen today for headache and neurologic problem.    Diagnoses and all orders for this visit:    Nonintractable episodic headache, unspecified headache type    Other orders  -     topiramate (TOPAMAX) 50 MG tablet; Take 1 tablet by mouth Every Night.  -     butalbital-acetaminophen-caffeine-codeine (FIORICET/CODEINE) -51-30 MG per capsule; Take 1 capsule by mouth Every 4 (Four) Hours As Needed for Headache.       Prescription drug management - meds as above  Discussed with the patient and his daughter the need not to take too much Fioricet with codeine.  Follow-up in the office in 6 months. Thank you for allowing me to share in the care of this patient.  Ozzy Smith M.D.

## 2018-11-14 ENCOUNTER — HOSPITAL ENCOUNTER (OUTPATIENT)
Dept: ONCOLOGY | Facility: HOSPITAL | Age: 68
Discharge: HOME | End: 2018-11-14
Attending: INTERNAL MEDICINE | Admitting: INTERNAL MEDICINE

## 2018-11-14 ENCOUNTER — HOSPITAL ENCOUNTER (OUTPATIENT)
Dept: ONCOLOGY | Facility: CLINIC | Age: 68
Setting detail: INFUSION SERIES
Discharge: HOME | End: 2018-11-14
Attending: INTERNAL MEDICINE | Admitting: INTERNAL MEDICINE

## 2018-11-14 ENCOUNTER — CLINICAL SUPPORT (OUTPATIENT)
Dept: ONCOLOGY | Facility: HOSPITAL | Age: 68
End: 2018-11-14

## 2018-11-14 LAB — ERYTHROCYTE [SEDIMENTATION RATE] IN BLOOD BY WESTERGREN METHOD: 8 MM/HR (ref 0–20)

## 2018-11-14 NOTE — PROGRESS NOTES
PATIENTS ONCOLOGY RECORD LOCATED IN [facility]      Subjective     Name:  NEVA GARCIA     Date:  2018  Address:  [address] [place] [address] [address] [address]  Home: [unfilled]  :  1950 AGE:  68 y.o.        RECORDS OBTAINED:  Patients Oncology Record is located in [facility]

## 2018-11-16 LAB
IGA1 MFR SER: 270 MG/DL (ref 50–400)
IGG1 SER-MCNC: 1030 MG/DL (ref 600–1500)
IGM SERPL-MCNC: 64 MG/DL (ref 40–300)

## 2018-11-30 ENCOUNTER — CLINICAL SUPPORT (OUTPATIENT)
Dept: ONCOLOGY | Facility: HOSPITAL | Age: 68
End: 2018-11-30

## 2018-11-30 ENCOUNTER — HOSPITAL ENCOUNTER (OUTPATIENT)
Dept: ONCOLOGY | Facility: CLINIC | Age: 68
Setting detail: INFUSION SERIES
Discharge: HOME | End: 2018-11-30
Attending: INTERNAL MEDICINE | Admitting: INTERNAL MEDICINE

## 2018-11-30 NOTE — PROGRESS NOTES
PATIENTS ONCOLOGY RECORD LOCATED IN [employer] [facility]      Subjective     Name:  NEVA GARCIA     Date:  2018  Address:  [address] [place] [address] [address] [address]  Home: [unfilled]  :  1950 AGE:  68 y.o.        RECORDS OBTAINED:  Patients Oncology Record is located in [facility]

## 2018-12-12 ENCOUNTER — CLINICAL SUPPORT (OUTPATIENT)
Dept: ONCOLOGY | Facility: HOSPITAL | Age: 68
End: 2018-12-12

## 2018-12-12 ENCOUNTER — HOSPITAL ENCOUNTER (OUTPATIENT)
Dept: ONCOLOGY | Facility: CLINIC | Age: 68
Setting detail: INFUSION SERIES
Discharge: HOME | End: 2018-12-12
Attending: INTERNAL MEDICINE | Admitting: INTERNAL MEDICINE

## 2018-12-12 NOTE — PROGRESS NOTES
PATIENTS ONCOLOGY RECORD LOCATED IN [facility]      Subjective     Name:  NEVA GARCIA     Date:  2018  Address:  [address] [place] [address] [address] [address]  Home: [unfilled]  :  1950 AGE:  68 y.o.        RECORDS OBTAINED:  Patients Oncology Record is located in [facility]

## 2019-03-06 ENCOUNTER — HOSPITAL ENCOUNTER (OUTPATIENT)
Dept: ONCOLOGY | Facility: CLINIC | Age: 69
Setting detail: INFUSION SERIES
Discharge: HOME | End: 2019-03-06
Attending: INTERNAL MEDICINE | Admitting: INTERNAL MEDICINE

## 2019-03-06 ENCOUNTER — CLINICAL SUPPORT (OUTPATIENT)
Dept: ONCOLOGY | Facility: HOSPITAL | Age: 69
End: 2019-03-06

## 2019-03-06 ENCOUNTER — HOSPITAL ENCOUNTER (OUTPATIENT)
Dept: ONCOLOGY | Facility: HOSPITAL | Age: 69
Discharge: HOME | End: 2019-03-06
Attending: INTERNAL MEDICINE | Admitting: INTERNAL MEDICINE

## 2019-03-06 LAB
ALBUMIN SERPL-MCNC: 4.1 G/DL (ref 3.5–4.8)
ALBUMIN/GLOB SERPL: 1.4 {RATIO} (ref 1–1.7)
ALP SERPL-CCNC: 50 IU/L (ref 32–91)
ALT SERPL-CCNC: 17 IU/L (ref 17–63)
ANION GAP SERPL CALC-SCNC: 13.1 MMOL/L (ref 10–20)
AST SERPL-CCNC: 20 IU/L (ref 15–41)
BILIRUB SERPL-MCNC: 0.7 MG/DL (ref 0.3–1.2)
BUN SERPL-MCNC: 14 MG/DL (ref 8–20)
BUN/CREAT SERPL: 10.8 (ref 6.2–20.3)
CALCIUM SERPL-MCNC: 9.2 MG/DL (ref 8.9–10.3)
CHLORIDE SERPL-SCNC: 103 MMOL/L (ref 101–111)
CONV CO2: 24 MMOL/L (ref 22–32)
CONV TOTAL PROTEIN: 7.1 G/DL (ref 6.1–7.9)
CREAT UR-MCNC: 1.3 MG/DL (ref 0.7–1.2)
ERYTHROCYTE [SEDIMENTATION RATE] IN BLOOD BY WESTERGREN METHOD: 6 MM/HR (ref 0–20)
GLOBULIN UR ELPH-MCNC: 3 G/DL (ref 2.5–3.8)
GLUCOSE SERPL-MCNC: 115 MG/DL (ref 65–99)
POTASSIUM SERPL-SCNC: 4.1 MMOL/L (ref 3.6–5.1)
SODIUM SERPL-SCNC: 136 MMOL/L (ref 136–144)

## 2019-03-07 LAB
HAV IGM SERPL QL IA: NONREACTIVE
HBV CORE IGM SERPL QL IA: NONREACTIVE
HBV SURFACE AG SERPL QL IA: NONREACTIVE
HCV AB SER DONR QL: NORMAL
HCV AB SER DONR QL: NORMAL

## 2019-03-15 ENCOUNTER — HOSPITAL ENCOUNTER (OUTPATIENT)
Dept: ONCOLOGY | Facility: CLINIC | Age: 69
Setting detail: INFUSION SERIES
Discharge: HOME | End: 2019-03-15
Attending: INTERNAL MEDICINE | Admitting: INTERNAL MEDICINE

## 2019-03-15 ENCOUNTER — HOSPITAL ENCOUNTER (OUTPATIENT)
Dept: ONCOLOGY | Facility: HOSPITAL | Age: 69
Discharge: HOME | End: 2019-03-15
Attending: INTERNAL MEDICINE | Admitting: INTERNAL MEDICINE

## 2019-03-15 LAB
ALBUMIN SERPL-MCNC: 3.8 G/DL (ref 3.5–4.8)
ALBUMIN/GLOB SERPL: 1.3 {RATIO} (ref 1–1.7)
ALP SERPL-CCNC: 51 IU/L (ref 32–91)
ALT SERPL-CCNC: 14 IU/L (ref 17–63)
ANION GAP SERPL CALC-SCNC: 9.9 MMOL/L (ref 10–20)
AST SERPL-CCNC: 17 IU/L (ref 15–41)
BILIRUB SERPL-MCNC: 0.7 MG/DL (ref 0.3–1.2)
BUN SERPL-MCNC: 12 MG/DL (ref 8–20)
BUN/CREAT SERPL: 10 (ref 6.2–20.3)
CALCIUM SERPL-MCNC: 9.1 MG/DL (ref 8.9–10.3)
CHLORIDE SERPL-SCNC: 107 MMOL/L (ref 101–111)
CONV CO2: 24 MMOL/L (ref 22–32)
CONV TOTAL PROTEIN: 6.8 G/DL (ref 6.1–7.9)
CREAT UR-MCNC: 1.2 MG/DL (ref 0.7–1.2)
GLOBULIN UR ELPH-MCNC: 3 G/DL (ref 2.5–3.8)
GLUCOSE SERPL-MCNC: 117 MG/DL (ref 65–99)
POTASSIUM SERPL-SCNC: 3.9 MMOL/L (ref 3.6–5.1)
SODIUM SERPL-SCNC: 137 MMOL/L (ref 136–144)

## 2019-03-22 ENCOUNTER — HOSPITAL ENCOUNTER (OUTPATIENT)
Dept: ONCOLOGY | Facility: CLINIC | Age: 69
Setting detail: INFUSION SERIES
Discharge: HOME | End: 2019-03-22
Attending: INTERNAL MEDICINE | Admitting: INTERNAL MEDICINE

## 2019-03-22 ENCOUNTER — CLINICAL SUPPORT (OUTPATIENT)
Dept: ONCOLOGY | Facility: HOSPITAL | Age: 69
End: 2019-03-22

## 2019-03-22 NOTE — PROGRESS NOTES
PATIENTS ONCOLOGY RECORD LOCATED IN [facility]      Subjective     Name:  NEVA GARCIA     Date:  2019  Address:  [address] [place] [address] [address] [address]  Home: [unfilled]  :  1950 AGE:  68 y.o.        RECORDS OBTAINED:  Patients Oncology Record is located in [facility]

## 2019-03-29 ENCOUNTER — HOSPITAL ENCOUNTER (OUTPATIENT)
Dept: ONCOLOGY | Facility: CLINIC | Age: 69
Setting detail: INFUSION SERIES
Discharge: HOME | End: 2019-03-29
Attending: INTERNAL MEDICINE | Admitting: INTERNAL MEDICINE

## 2019-03-29 ENCOUNTER — CLINICAL SUPPORT (OUTPATIENT)
Dept: ONCOLOGY | Facility: HOSPITAL | Age: 69
End: 2019-03-29

## 2019-03-29 NOTE — PROGRESS NOTES
PATIENTS ONCOLOGY RECORD LOCATED IN [email or web address]      Subjective     Name:  NEVA GARCIA     Date:  2019  Address:  [address] [place] [address] [address] [address]  Home: [unfilled]  :  1950 AGE:  68 y.o.        RECORDS OBTAINED:  Patients Oncology Record is located in [facility]

## 2019-04-05 ENCOUNTER — HOSPITAL ENCOUNTER (OUTPATIENT)
Dept: ONCOLOGY | Facility: CLINIC | Age: 69
Setting detail: INFUSION SERIES
Discharge: HOME | End: 2019-04-05
Attending: INTERNAL MEDICINE | Admitting: INTERNAL MEDICINE

## 2019-04-05 ENCOUNTER — CLINICAL SUPPORT (OUTPATIENT)
Dept: ONCOLOGY | Facility: HOSPITAL | Age: 69
End: 2019-04-05

## 2019-04-05 NOTE — PROGRESS NOTES
PATIENTS ONCOLOGY RECORD LOCATED IN [facility]      Subjective     Name:  NEVA GARCIA     Date:  2019  Address:  [address] [place] [address] [address] [address]  Home: [unfilled]  :  1950 AGE:  68 y.o.        RECORDS OBTAINED:  Patients Oncology Record is located in [facility]

## 2019-04-11 ENCOUNTER — CLINICAL SUPPORT (OUTPATIENT)
Dept: ONCOLOGY | Facility: HOSPITAL | Age: 69
End: 2019-04-11

## 2019-04-11 ENCOUNTER — HOSPITAL ENCOUNTER (OUTPATIENT)
Dept: ONCOLOGY | Facility: CLINIC | Age: 69
Setting detail: INFUSION SERIES
Discharge: HOME | End: 2019-04-11
Attending: NURSE PRACTITIONER | Admitting: NURSE PRACTITIONER

## 2019-04-11 NOTE — PROGRESS NOTES
PATIENTS ONCOLOGY RECORD LOCATED IN [email or web address]      Subjective     Name:  NEVA GARCIA     Date:  2019  Address:  [address] [place] [address] [address] [address]  Home: [unfilled]  :  1950 AGE:  68 y.o.        RECORDS OBTAINED:  Patients Oncology Record is located in [facility]

## 2019-05-23 ENCOUNTER — HOSPITAL ENCOUNTER (OUTPATIENT)
Dept: ONCOLOGY | Facility: CLINIC | Age: 69
Setting detail: INFUSION SERIES
Discharge: HOME | End: 2019-05-23
Attending: INTERNAL MEDICINE | Admitting: INTERNAL MEDICINE

## 2019-05-23 ENCOUNTER — CLINICAL SUPPORT (OUTPATIENT)
Dept: ONCOLOGY | Facility: HOSPITAL | Age: 69
End: 2019-05-23

## 2019-05-23 NOTE — PROGRESS NOTES
PATIENTS ONCOLOGY RECORD LOCATED IN [facility]      Subjective     Name:  NEVA CHAIDEZSOR     Date:  2019  Address:  [address] [place] [address] [address] [address]  Home: [unfilled]  :  1950 AGE:  68 y.o.        RECORDS OBTAINED:  Patients Oncology Record is located in [facility]

## 2019-08-23 PROBLEM — C91.10 CHRONIC LYMPHOCYTIC LEUKEMIA OF B-CELL TYPE (HCC): Status: ACTIVE | Noted: 2019-08-23

## 2019-08-26 PROBLEM — I71.40 AAA (ABDOMINAL AORTIC ANEURYSM) (HCC): Status: ACTIVE | Noted: 2019-08-26

## 2019-08-26 PROBLEM — I26.99 PULMONARY EMBOLI: Status: ACTIVE | Noted: 2019-08-26

## 2019-08-26 NOTE — PROGRESS NOTES
Hematology/Oncology Outpatient Follow Up    PATIENT NAME:Sebas Rouse  :1950  MRN: [id number]  PRIMARY CARE PHYSICIAN: Ozzy Gold MD  REFERRING PHYSICIAN: Ozzy Gold MD    Chief Complaint   Patient presents with   • Follow-up     CLL/SLL        HISTORY OF PRESENT ILLNESS:   1.  CLL/SLL diagnosed 2017.   • 17 - [facility] by Dr. Contreras on referral from Dr. Saez for evaluation for CLL/SLL. The patient developed what appeared to be a TIA vs. stroke in 2017. He was admitted at the time of the [facility]. Brain MRI was performed that showed a 10 mm mass in the right parotid gland and a 14 x 20 mm nodule in the superior aspect of the left parotid gland.  There was also suggestion of larger masses projecting within or adjacent to the posterior inferior aspect of the superficial lobes of the parotid gland.  On the left these measured 3 cm and on the right there was a 3.4 cm mass.  CT scan of the neck was recommended to further evaluate.  On 17 patient had a CT scan of the neck with contrast.  This showed multiple enhancing masses involving both superficial and deep lobes of the parotid gland bilaterally with the largest measuring 2.3 cm on either side.  Underlying malignancy could not be eliminated.  There was an ovoid soft tissue mass which may represent a small node anterolateral to the larynx to the left and inferior to the hyoid bone.  Direct visualization was recommended.  Patient was subsequently seen by Dr. Saez with ENT.  Patient had ultrasound guided fine needle aspirate and biopsy of the left neck mass on 17.  Pathology revealed cluster of epithelium with morphologic features of follicular epithelial cells favoring Hashimoto thyroiditis.  On flow cytometry there was identification of 6% B cells that was CD19 positive, CD20 positive, CD5 positive and CD23 positive.  The immunophenotype was indicative of chronic  lymphocytic leukemia/small lymphocytic lymphoma.  At the time of consultation, he stated he was asymptomatic.  He did not have night sweats.  He has had an intentional 30 lb weight loss over the past several months.  His energy level has been good.  He denied any fevers or chills, nausea or vomiting.  There were no other significant masses in the rest of his body.  It was discussed with the patient that this was a chronic disease and that indications of treatment would include pancytopenias, immune dysfunction resulting in repeated infections or lymphadenopathy causing organ dysfunction or autoimmune complications. The disease was not curative. It was recommended for him to have CT scans of the chest, abdomen and pelvis including systemic laboratory evaluation. ESR 10 (N), beta-2 microglobulin 1.5 (N). CMP normal. WBC 6.57, hemoglobin 15.4, MCV 92, platelets 169, white blood cell count differential normal.   • 9/13/17 - Chemistry panel was essentially normal.  LDH (N) 126.  Sed rate 10.  Beta-2 microglobulin (N) 1.5.  WBC 6.5, hemoglobin 15.4, platelet count 169,000.    • 9/21/17 - CT scan of the chest, abdomen and pelvis:  Mediastinal nodes were not significantly enlarged.  The bilateral axillary nodes were not significantly enlarged.  No adenopathy found.  Evidence of emphysema.  CT scan of the abdomen:  There was a subcentimeter hypodense lesion in the right lobe of the liver, hypodense lesion on the right kidney consistent with cyst, three on the left.  There is a normal spleen size.  Infrarenal abdominal aortic aneurysm 3.7 cm with a third of the lumen filled with thrombus.  Bilateral inguinal nodes were not significantly enlarged.    • 10/2/17 - Patient seen in followup by Dr. Contreras, recommending excisional biopsy of neck mass to complete the confirmation of CLL. He was referred to Dr. Jennings for evaluation of his AAA with thrombosis.   • 11/7/18 - PET scan showed bilateral parotid lesions with metabolic  activity noted. Minimal metabolic activity within the subcutaneous soft tissues of the right gluteal area could reflect some non-specific inflammation, infrarenal abdominal aortic aneurysm. Bilateral renal lesions suggestive of cysts.   • 11/14/18 - Patient returned to office after a 13 month absence, requesting change of physician from Dr. Contreras to Dr. Benjamin. Returned to office on referral from his primary care physician, Dr. Villatoro. Stated he saw Dr. Saez as advised by Dr. Contreras, who explained the risks associated with lymph node excision including nerve damage resulting in possible facial droop. Patient chose to forego the lymph node excision.  WBC 8.2 with 71% neutrophils, 18% lymphocytes, 8% monocytes, hemoglobin 14.4 and platelet count 170,000.  On exam 4 x 3 cm bilateral angle of the jaw lymphadenopathy.  Discussed benefit of proceeding with a bone marrow aspiration to confirm diagnosis. ESR 8 (0-20). IgA 270 (), IgG 1030 (600-1500), IgM 64 ().       • 11/30/18 - Bone marrow aspiration and biopsy with minimal involvement by patient’s previously-diagnosed CLL/SLL with pattern of involvement interstitial and extent of involvement 5% by CD20 positive/lambda positive cells. Bone marrow cellularity 30% with absent fibrosis and adequate iron storage. Flow cytometry with small clonal B-cell population (2%). Immunohistochemistry with B-cells comprising approximately 5% of cellularity. FISH studies with no evidence of trisomy 12, 13q14.3, 17p13 (P53) or RICARDO deletion. Cytogenetics with 46 XY normal male karyotype. NGS TP53 sequencing report with no mutations identified.  • 12/12/18 - Discussed findings. Made him aware of low-grade process not needing any treatment except patient is complaining of pain in his right upper neck. Gone over options of possible radiation therapy, chemotherapy or antibody treatment with Rituximab. He will think about it and let me know.   • 3/6/19 - Patient complains of  increasing left neck mass with pain. Discussed treatment options again. He is in agreement to proceed with treatment. Order written for Rituximab 375 mg/M2 IV days 1, 8, 15 and 22. Sed rate 6 (0-20). Hepatitis panel non-reactive.    • 3/15/19 - Day 1 Rituximab administered.   • 3/22/19 - Day 8 Rituximab administered.   • 3/29/19 - Day 15 Rituximab administered.   • 4/5/19 - Day 22 Rituximab administered.   • 4/11/19 - Patient reports dizziness present for three weeks and progressive, mostly with positional changes without syncopal events and no other symptomatology. Blood pressures have been normal. Will obtain MRI brain to further evaluate. Cautioned on orthostatic precautions.   • 4/18/19 - MRI brain with no acute intracranial process identified. Suggestion of mild chronic small vessel ischemic disease and bilateral parotid masses partially visualized.   2.   AAA with thrombosis diagnosed September 2017, pulmonary emboli diagnosed 2017.   • 11/14/18 - Patient reports he was hospitalized at [facility] in 2017 with pulmonary emboli. Taking Xarelto for anticoagulation. Instructed to continue Xarelto for anticoagulation.  D-dimer 0.48 (<0.45).   • 12/12/18 - [facility].    • 8/28/2019 discussed the possibility of stopping anticoagulation.  Patient wants to discuss this with his daughter first.    Past Medical History:   Diagnosis Date   • AAA (abdominal aortic aneurysm) (CMS/HCC) 8/26/2019   • Cancer (CMS/HCC)    • Depression    • GERD (gastroesophageal reflux disease)    • H/O complete eye exam 10/2016   • Headache    • Headache, tension-type    • Headache, tension-type    • HL (hearing loss)    • Migraine    • Panic attack    • Sleep apnea    • TB (pulmonary tuberculosis)    • Ulnar nerve compression, left 10/6/2017       Past Surgical History:   Procedure Laterality Date   • APPENDECTOMY     • COLONOSCOPY  2015   • SHOULDER ARTHROSCOPY      RIGHT ARM         Current  Outpatient Medications:   •  buPROPion XL (WELLBUTRIN XL) 150 MG 24 hr tablet, , Disp: , Rfl:   •  butalbital-acetaminophen-caffeine-codeine (FIORICET/CODEINE) -71-30 MG per capsule, Take 1 capsule by mouth Every 4 (Four) Hours As Needed for Headache., Disp: 30 capsule, Rfl: 5  •  diclofenac (VOLTAREN) 1 % gel gel, Apply 4 g topically 4 (Four) Times a Day As Needed., Disp: , Rfl:   •  ketoconazole (NIZORAL) 2 % shampoo, Apply  topically 2 (Two) Times a Week., Disp: , Rfl:   •  lithium (ESKALITH) 450 MG CR tablet, Take 450 mg by mouth 3 (Three) Times a Day., Disp: , Rfl:   •  Loratadine 10 MG capsule, Take  by mouth., Disp: , Rfl:   •  LORazepam (ATIVAN) 1 MG tablet, , Disp: , Rfl:   •  Omega-3 Fatty Acids (FISH OIL) 1000 MG capsule capsule, Take  by mouth 3 (Three) Times a Day With Meals., Disp: , Rfl:   •  omeprazole (priLOSEC) 20 MG capsule, Take 20 mg by mouth Daily., Disp: , Rfl:   •  risperiDONE (RISPERDAL) 2 MG tablet, , Disp: , Rfl:   •  rivaroxaban (XARELTO) 20 MG tablet, Take 20 mg by mouth Daily., Disp: , Rfl:   •  Testosterone Cypionate (DEPOTESTOTERONE CYPIONATE) 200 MG/ML injection, , Disp: , Rfl:   •  topiramate (TOPAMAX) 50 MG tablet, Take 1 tablet by mouth Every Night., Disp: 30 tablet, Rfl: 11  •  triamcinolone (KENALOG) 0.1 % cream, , Disp: , Rfl:     No Known Allergies    Family History   Problem Relation Age of Onset   • Heart disease Other    • Hypertension Other    • Hyperlipidemia Other    • Leukemia Other    • Depression Other    • COPD Other    • Diabetes Other    • Migraines Mother        Cancer-related family history is not on file.    Social History     Tobacco Use   • Smoking status: Former Smoker     Packs/day: 4.00     Years: 35.00     Pack years: 140.00     Start date: 1966     Last attempt to quit: 2002     Years since quittin.6   • Smokeless tobacco: Never Used   • Tobacco comment: didn't enjoy it   Substance Use Topics   • Alcohol use: No   • Drug use: No       I  "have reviewed the history of present illness, past medical history, family history, social history, lab results, all notes and other records since the patient was last seen on 5/23/19.    SUBJECTIVE: Patient is here for reevaluation of his diagnosis of CLL/SLL, pulmonary embolism and abdominal aortic aneurysm. Reports that when he presses on in right side of his neck its painful but the more that he does that the less painful it is. Its a dull pain. He is having dizzy spells.         SHELIA Contreras present during office visit.       REVIEW OF SYSTEMS:  Review of Systems   Constitutional: Negative for chills and fever.   HENT: Negative for ear pain, mouth sores, nosebleeds and sore throat.         Pain in his right neck.    Eyes: Negative for photophobia and visual disturbance.   Respiratory: Negative for wheezing and stridor.    Cardiovascular: Negative for chest pain and palpitations.   Gastrointestinal: Negative for abdominal pain, diarrhea, nausea and vomiting.   Endocrine: Negative for cold intolerance and heat intolerance.   Genitourinary: Negative for dysuria and hematuria.   Musculoskeletal: Negative for joint swelling and neck stiffness.   Skin: Negative for color change and rash.   Neurological: Positive for dizziness. Negative for seizures and syncope.   Hematological: Negative for adenopathy.        No obvious bleeding   Psychiatric/Behavioral: Negative for agitation, confusion and hallucinations.       OBJECTIVE:    Vitals:    08/28/19 0855   BP: 117/70   Pulse: 61   Resp: 20   Temp: 97.8 °F (36.6 °C)   Weight: 89.4 kg (197 lb 3.2 oz)   Height: 190.5 cm (75\")   PainSc: 0-No pain       ECOG  (0) Fully active, able to carry on all predisease performance without restriction    Physical Exam   Constitutional: He is oriented to person, place, and time. No distress.   HENT:   Head: Normocephalic and atraumatic.   Partial dentures     Eyes: Conjunctivae and EOM are normal. Right eye exhibits no discharge. " Left eye exhibits no discharge. No scleral icterus.   Neck: Normal range of motion. Neck supple. No thyromegaly present.   Cardiovascular: Normal rate, regular rhythm and normal heart sounds. Exam reveals no gallop and no friction rub.   Pulmonary/Chest: Effort normal. No stridor. No respiratory distress. He has no wheezes.   Abdominal: Soft. Bowel sounds are normal. He exhibits no mass. There is no tenderness. There is no rebound and no guarding.   Musculoskeletal: Normal range of motion. He exhibits no tenderness.   Lymphadenopathy:     He has no cervical adenopathy.   Bilateral 4x2 lymphadenopathy in the angle of the jaw   Neurological: He is alert and oriented to person, place, and time. He exhibits normal muscle tone.   Skin: Skin is warm. No rash noted. He is not diaphoretic. No erythema.   Psychiatric: He has a normal mood and affect. His behavior is normal.   Nursing note and vitals reviewed.      RECENT LABS  No results found for: WBC, RBC, HGB, HCT, MCV, MCH, MCHC, RDW, RDWSD, MPV, PLT, NEUTRORELPCT, LYMPHORELPCT, MONORELPCT, EOSRELPCT, BASORELPCT, AUTOIGPER, NEUTROABS, LYMPHSABS, MONOSABS, EOSABS, BASOSABS, AUTOIGNUM, NRBC    Lab Results   Component Value Date    GLUCOSE 117 (H) 03/15/2019    BUN 12 03/15/2019    CREATININE 1.2 03/15/2019    BCR 10.0 03/15/2019    K 3.9 03/15/2019    CO2 24 03/15/2019    CALCIUM 9.1 03/15/2019    ALBUMIN 3.8 03/15/2019    LABIL2 1.3 03/15/2019    AST 17 03/15/2019    ALT 14 (L) 03/15/2019         Assessment/Plan     Chronic lymphocytic leukemia of B-cell type not having achieved remission (CMS/HCC)  - Comprehensive Metabolic Panel  - Sedimentation Rate  - Lactate Dehydrogenase  - CBC & Differential  - CT Soft Tissue Neck Without Contrast  - CT chest wo contrast  - CT abdomen pelvis wo contrast    Other chronic pulmonary embolism without acute cor pulmonale (CMS/HCC)  - D-dimer, Quantitative    Abdominal aortic aneurysm (AAA) without rupture  (CMS/[facility])      ASSESSMENT:  Denies having fevers night sweats or weight loss.  He still has the lymphadenopathy in the angle of the jaw.  He is complaining of lightheadedness and is considering getting physical therapy as he is not on any blood pressure medications and MRI brain was negative.  Have discussed obtaining follow-up by CTs on him.  Also will proceed with lab testing.  Have told him that if no significant disease on CTs will observe him.  However, if there is increasing lymphadenopathy will consider repeat biopsy or more treatment.  Discussed the possibility of decreasing the dose or stopping anticoagulation.  This will probably need venous Dopplers of his lower extremities prior.  Patient also discuss with his daughter first.      PLAN:  Will order CT neck, chest, abdomen ad pelvis.   Consider physical therapy for the dizziness.   CBC, CMP, sed rate, LDH.  D-dimer.           I have reviewed labs results, imaging, vitals, and medications with the patient today. Will follow up in three months with a CBC.     Patient verbalized understanding and is in agreement of the above plan.    I have reviewed and validated the information above.   Vernon Benjamin M.D., F.A.C.P.    Much of the above report is an electronic transcription/translation of the spoken language to printed text using Dragon Software. As such, the subtleties and finesse of the spoken language may permit erroneous, or at times, nonsensical words or phrases to be inadvertently transcribed; thus changes may be made at a later date to rectify these errors.

## 2019-08-28 ENCOUNTER — APPOINTMENT (OUTPATIENT)
Dept: LAB | Facility: HOSPITAL | Age: 69
End: 2019-08-28

## 2019-08-28 ENCOUNTER — OFFICE VISIT (OUTPATIENT)
Dept: ONCOLOGY | Facility: CLINIC | Age: 69
End: 2019-08-28

## 2019-08-28 VITALS
BODY MASS INDEX: 24.52 KG/M2 | SYSTOLIC BLOOD PRESSURE: 117 MMHG | TEMPERATURE: 97.8 F | HEART RATE: 61 BPM | DIASTOLIC BLOOD PRESSURE: 70 MMHG | RESPIRATION RATE: 20 BRPM | WEIGHT: 197.2 LBS | HEIGHT: 75 IN

## 2019-08-28 DIAGNOSIS — C91.10 CHRONIC LYMPHOCYTIC LEUKEMIA OF B-CELL TYPE NOT HAVING ACHIEVED REMISSION (HCC): Primary | ICD-10-CM

## 2019-08-28 DIAGNOSIS — I27.82 OTHER CHRONIC PULMONARY EMBOLISM WITHOUT ACUTE COR PULMONALE (HCC): ICD-10-CM

## 2019-08-28 DIAGNOSIS — I71.40 ABDOMINAL AORTIC ANEURYSM (AAA) WITHOUT RUPTURE (HCC): ICD-10-CM

## 2019-08-28 PROCEDURE — G0463 HOSPITAL OUTPT CLINIC VISIT: HCPCS | Performed by: INTERNAL MEDICINE

## 2019-08-28 PROCEDURE — 99214 OFFICE O/P EST MOD 30 MIN: CPT | Performed by: INTERNAL MEDICINE

## 2019-08-30 ENCOUNTER — TELEPHONE (OUTPATIENT)
Dept: ONCOLOGY | Facility: CLINIC | Age: 69
End: 2019-08-30

## 2019-09-06 ENCOUNTER — TELEPHONE (OUTPATIENT)
Dept: ONCOLOGY | Facility: CLINIC | Age: 69
End: 2019-09-06

## 2019-09-06 NOTE — TELEPHONE ENCOUNTER
Patient states he needs his CT neck, chest, abdomen and pelvis to be done at [facility].  noel Olmedo

## 2019-09-26 ENCOUNTER — HOSPITAL ENCOUNTER (OUTPATIENT)
Dept: OTHER | Facility: HOSPITAL | Age: 69
Discharge: HOME | End: 2019-09-26

## 2019-09-26 DIAGNOSIS — C95.90 LEUKEMIA CONSULTATION (HCC): ICD-10-CM

## 2019-09-26 DIAGNOSIS — Z71.89 LEUKEMIA CONSULTATION (HCC): ICD-10-CM

## 2019-11-15 ENCOUNTER — TELEPHONE (OUTPATIENT)
Dept: ONCOLOGY | Facility: CLINIC | Age: 69
End: 2019-11-15

## 2019-11-15 NOTE — TELEPHONE ENCOUNTER
"Spoke w/ pt's daughter regarding rescheduling his appt due to Dr Benjamin not seeing pt's here any longer.  She states that he is going to go with him wherever he goes.  I informed her that we do not know what his plans are at this time.  She states that \"I've already heard through the [facility] where he is going and we are going to go with him.\"  She requests that I cancel his appt.  Appt cancelled per her request.  "

## 2019-11-20 ENCOUNTER — APPOINTMENT (OUTPATIENT)
Dept: ONCOLOGY | Facility: CLINIC | Age: 69
End: 2019-11-20

## 2019-11-20 ENCOUNTER — APPOINTMENT (OUTPATIENT)
Dept: LAB | Facility: HOSPITAL | Age: 69
End: 2019-11-20

## 2020-12-23 ENCOUNTER — OFFICE VISIT (OUTPATIENT)
Dept: PSYCHIATRY | Facility: CLINIC | Age: 70
End: 2020-12-23

## 2020-12-23 DIAGNOSIS — F51.05 INSOMNIA DUE TO MENTAL CONDITION: ICD-10-CM

## 2020-12-23 DIAGNOSIS — F41.1 GENERALIZED ANXIETY DISORDER: ICD-10-CM

## 2020-12-23 DIAGNOSIS — F31.32 BIPOLAR I DISORDER, MODERATE, CURRENT OR MOST RECENT EPISODE DEPRESSED, WITH ANXIOUS DISTRESS (HCC): Primary | ICD-10-CM

## 2020-12-23 PROCEDURE — 90792 PSYCH DIAG EVAL W/MED SRVCS: CPT | Performed by: PSYCHIATRY & NEUROLOGY

## 2020-12-23 RX ORDER — LITHIUM CARBONATE 450 MG
450 TABLET, EXTENDED RELEASE ORAL 3 TIMES DAILY
Qty: 270 TABLET | Refills: 1 | Status: SHIPPED | OUTPATIENT
Start: 2020-12-23 | End: 2021-03-25 | Stop reason: SDUPTHER

## 2020-12-23 RX ORDER — RISPERIDONE 2 MG/1
2 TABLET ORAL NIGHTLY
Qty: 90 TABLET | Refills: 1 | Status: SHIPPED | OUTPATIENT
Start: 2020-12-23 | End: 2021-03-25 | Stop reason: SDUPTHER

## 2020-12-23 RX ORDER — LORAZEPAM 0.5 MG/1
1.5 TABLET ORAL NIGHTLY
Qty: 90 TABLET | Refills: 2 | Status: SHIPPED | OUTPATIENT
Start: 2020-12-23 | End: 2021-11-16 | Stop reason: SDUPTHER

## 2020-12-23 RX ORDER — BUPROPION HYDROCHLORIDE 300 MG/1
300 TABLET ORAL EVERY MORNING
Qty: 90 TABLET | Refills: 1 | Status: SHIPPED | OUTPATIENT
Start: 2020-12-23 | End: 2021-03-25 | Stop reason: SDUPTHER

## 2020-12-23 NOTE — PROGRESS NOTES
"Subjective   Sebas Rouse is a 70 y.o. male who presents today for initial evaluation     Chief Complaint: \" [facility] \"    History of Present Illness: the pt suffered from bipolar  depression for 20 years, was stable on wellbutrin and lorazepam for many years.  His [facility] provider was not wiling to prescribe lorazepam anymore   He was stable on Li and Wellbutrin , lorazepam   He is on Li 900 QAM and 450 mg QHS , Li level was checked in Sept - Oct and it was WNL, stated he was always in therapeutic range for al those years     Usually his mood is stable, but when he has not many things to do, he is getting fidgety, constantly thinking about what he needs to do, thinking about other staff   Sleep is fair on lorazepam  The pt denied feeling hopeless/helpless, denied AVH/SI/HI now     No sxs of tio hypomania   No panic attacks     The following portions of the patient's history were reviewed and updated as appropriate: allergies, current medications, past family history, past medical history, past social history, past surgical history and problem list.    PAST PSYCHIATRIC HISTORY  Axis I  Affective/Bipolar Disorder, Anxiety/Panic Disorder  2002 [place], at that time he was wandering , walking a lot, police brought hin to the hospital , he was also experiencing AVH  Axis II  Defer     PAST OUTPATIENT TREATMENT  Diagnosis treated:  Affective Disorder, Anxiety/Panic Disorder  Treatment Type:  Psychotherapy, Medication Management  Prior Psychiatric Medications:  abilify - side effects  lexapro - not effective   seroquel - sedation , weight gain     Support Groups:  None   Sequelae Of Mental Disorder:  emotional distress          Interval History  No Change    Side Effects  Denied       Past Medical History:  Past Medical History:   Diagnosis Date   • AAA (abdominal aortic aneurysm) (CMS/[facility]) 8/26/2019   • Cancer (CMS/[facility])    • Depression    • GERD " (gastroesophageal reflux disease)    • H/O complete eye exam 10/2016   • Headache    • Headache, tension-type    • Headache, tension-type    • HL (hearing loss)    • Migraine    • Panic attack    • Sleep apnea    • TB (pulmonary tuberculosis)    • Ulnar nerve compression, left 10/6/2017       Social History:  Social History     Socioeconomic History   • Marital status:      Spouse name: Not on file   • Number of children: Not on file   • Years of education: Not on file   • Highest education level: Not on file   Tobacco Use   • Smoking status: Former Smoker     Packs/day: 4.00     Years: 35.00     Pack years: 140.00     Start date: 1966     Quit date: 2002     Years since quittin.9   • Smokeless tobacco: Never Used   • Tobacco comment: didn't enjoy it   Substance and Sexual Activity   • Alcohol use: No   • Drug use: No   • Sexual activity: Never     The pt lives with daughter   2 years in the  service in [facility] [employer]     Family History:  Family History   Problem Relation Age of Onset   • Heart disease Other    • Hypertension Other    • Hyperlipidemia Other    • Leukemia Other    • Depression Other    • COPD Other    • Diabetes Other    • Migraines Mother        Past Surgical History:  Past Surgical History:   Procedure Laterality Date   • APPENDECTOMY     • COLONOSCOPY     • SHOULDER ARTHROSCOPY      RIGHT ARM       Problem List:  Patient Active Problem List   Diagnosis   • Bipolar I disorder, moderate, current or most recent episode depressed, with anxious distress (CMS/HCC)   • Fatty liver   • Nonintractable episodic headache   • Ulnar nerve compression, left   • CLL/SLL   • Pulmonary emboli (CMS/HCC)   • AAA (abdominal aortic aneurysm) (CMS/HCC)   • Generalized anxiety disorder   • Insomnia due to mental condition       Allergy:   No Known Allergies     Discontinued Medications:  Medications Discontinued During This Encounter   Medication Reason   • lithium (ESKALITH) 450 MG CR tablet  Reorder   • buPROPion XL (WELLBUTRIN XL) 150 MG 24 hr tablet Reorder   • risperiDONE (RISPERDAL) 2 MG tablet Reorder   • LORazepam (ATIVAN) 1 MG tablet Reorder       Current Medications:   Current Outpatient Medications   Medication Sig Dispense Refill   • buPROPion XL (WELLBUTRIN XL) 300 MG 24 hr tablet Take 1 tablet by mouth Every Morning. 90 tablet 1   • butalbital-acetaminophen-caffeine-codeine (FIORICET/CODEINE) -09-30 MG per capsule Take 1 capsule by mouth Every 4 (Four) Hours As Needed for Headache. 30 capsule 5   • diclofenac (VOLTAREN) 1 % gel gel Apply 4 g topically 4 (Four) Times a Day As Needed.     • ketoconazole (NIZORAL) 2 % shampoo Apply  topically 2 (Two) Times a Week.     • lithium (ESKALITH) 450 MG CR tablet Take 1 tablet by mouth 3 (Three) Times a Day. 270 tablet 1   • Loratadine 10 MG capsule Take  by mouth.     • LORazepam (ATIVAN) 0.5 MG tablet Take 3 tablets by mouth Every Night. 90 tablet 2   • Omega-3 Fatty Acids (FISH OIL) 1000 MG capsule capsule Take  by mouth 3 (Three) Times a Day With Meals.     • omeprazole (priLOSEC) 20 MG capsule Take 20 mg by mouth Daily.     • risperiDONE (RisperDAL) 2 MG tablet Take 1 tablet by mouth Every Night. 90 tablet 1   • rivaroxaban (XARELTO) 20 MG tablet Take 20 mg by mouth Daily.     • Testosterone Cypionate (DEPOTESTOTERONE CYPIONATE) 200 MG/ML injection      • triamcinolone (KENALOG) 0.1 % cream        No current facility-administered medications for this visit.          Psychological ROS: positive for - anxiety  negative for - depression, disorientation, hallucinations, obsessive thoughts or suicidal ideation      Physical Exam:   There were no vitals taken for this visit.    Mental Status Exam:   Hygiene:   good  Cooperation:  Cooperative  Eye Contact:  Good  Psychomotor Behavior:  Appropriate  Affect:  Appropriate  Mood: fluctates  Hopelessness: Denies  Speech:  Normal  Thought Process:  Goal directed  Thought Content:  Normal and Mood  congruent  Suicidal:  None  Homicidal:  None  Hallucinations:  None  Delusion:  None  Memory:  Intact  Orientation:  Person, Place, Time and Situation  Reliability:  good  Insight:  Good  Judgement:  Good  Impulse Control:  Fair  Physical/Medical Issues:  Yes         PHQ-9 Depression Screening  Little interest or pleasure in doing things? 1   Feeling down, depressed, or hopeless? 1   Trouble falling or staying asleep, or sleeping too much? 1   Feeling tired or having little energy? 1   Poor appetite or overeating? 0   Feeling bad about yourself - or that you are a failure or have let yourself or your family down? 0   Trouble concentrating on things, such as reading the newspaper or watching television? 1   Moving or speaking so slowly that other people could have noticed? Or the opposite - being so fidgety or restless that you have been moving around a lot more than usual? 0   Thoughts that you would be better off dead, or of hurting yourself in some way? 0   PHQ-9 Total Score 5   If you checked off any problems, how difficult have these problems made it for you to do your work, take care of things at home, or get along with other people? Very difficult           Former smoker    I advised Sebas of the risks of tobacco use.     Lab Results:   No visits with results within 3 Month(s) from this visit.   Latest known visit with results is:   Hospital Outpatient Visit on 07/31/2017   Component Date Value Ref Range Status   • Creatinine 07/31/2017 1.20  0.60 - 1.30 mg/dL Final    Serial Number: [id number]:  [id number]       Assessment/Plan   Problems Addressed this Visit        Other    Bipolar I disorder, moderate, current or most recent episode depressed, with anxious distress (CMS/HCC) - Primary    Relevant Medications    buPROPion XL (WELLBUTRIN XL) 300 MG 24 hr tablet    risperiDONE (RisperDAL) 2 MG tablet    lithium (ESKALITH) 450 MG CR tablet    LORazepam (ATIVAN) 0.5 MG tablet    Generalized anxiety disorder     Relevant Medications    buPROPion XL (WELLBUTRIN XL) 300 MG 24 hr tablet    risperiDONE (RisperDAL) 2 MG tablet    lithium (ESKALITH) 450 MG CR tablet    LORazepam (ATIVAN) 0.5 MG tablet    Insomnia due to mental condition    Relevant Medications    buPROPion XL (WELLBUTRIN XL) 300 MG 24 hr tablet    risperiDONE (RisperDAL) 2 MG tablet    lithium (ESKALITH) 450 MG CR tablet    LORazepam (ATIVAN) 0.5 MG tablet      Diagnoses       Codes Comments    Bipolar I disorder, moderate, current or most recent episode depressed, with anxious distress (CMS/HCC)    -  Primary ICD-10-CM: F31.32  ICD-9-CM: 296.52     Generalized anxiety disorder     ICD-10-CM: F41.1  ICD-9-CM: 300.02     Insomnia due to mental condition     ICD-10-CM: F51.05  ICD-9-CM: 300.9, 327.02           Visit Diagnoses:    ICD-10-CM ICD-9-CM   1. Bipolar I disorder, moderate, current or most recent episode depressed, with anxious distress (CMS/HCC)  F31.32 296.52   2. Generalized anxiety disorder  F41.1 300.02   3. Insomnia due to mental condition  F51.05 300.9     327.02       TREATMENT PLAN/GOALS: Continue supportive psychotherapy efforts and medications as indicated. Treatment and medication options discussed during today's visit. Patient ackowledged and verbally consented to continue with current treatment plan and was educated on the importance of compliance with treatment and follow-up appointments.    MEDICATION ISSUES:  INSPECT reviewed as expected 11/18/2020 lorazepam     The pt was stable on current meds, previous GDR attempts failed, the pt was gettng unstable   Long term benzo use discussed with the pt and his daughter , they verbalized understanding   And were willing to continue meds     Discussed medication options and treatment plan of prescribed medication as well as the risks, benefits, and side effects including potential falls, possible impaired driving and metabolic adversities among others. Patient is agreeable to call the office with  any worsening of symptoms or onset of side effects. Patient is agreeable to call 911 or go to the nearest ER should he/she begin having SI/HI. No medication side effects or related complaints today.     MEDS ORDERED DURING VISIT:  New Medications Ordered This Visit   Medications   • buPROPion XL (WELLBUTRIN XL) 300 MG 24 hr tablet     Sig: Take 1 tablet by mouth Every Morning.     Dispense:  90 tablet     Refill:  1   • risperiDONE (RisperDAL) 2 MG tablet     Sig: Take 1 tablet by mouth Every Night.     Dispense:  90 tablet     Refill:  1   • lithium (ESKALITH) 450 MG CR tablet     Sig: Take 1 tablet by mouth 3 (Three) Times a Day.     Dispense:  270 tablet     Refill:  1   • LORazepam (ATIVAN) 0.5 MG tablet     Sig: Take 3 tablets by mouth Every Night.     Dispense:  90 tablet     Refill:  2       Return in about 3 months (around 3/23/2021).         This document has been electronically signed by Elizabeth Watkins MD  December 23, 2020 13:46 EST    EMR Dragon transcription disclaimer:  Some of this encounter note is an electronic transcription translation of spoken language to printed text. The electronic translation of spoken language may permit erroneous, or at times, nonsensical words or phrases to be inadvertently transcribed; Although I have reviewed the note for such errors some may still exist.

## 2020-12-23 NOTE — PATIENT INSTRUCTIONS
Bipolar 1 Disorder  Bipolar 1 disorder is a mental health disorder in which a person has episodes of emotional highs, or tio, and may also have episodes of lows, or depression. Bipolar 1 disorder is different from other bipolar disorders in that it involves extreme episodes of tio (manic episodes). These episodes last at least one week or involve symptoms that are so severe that hospitalization is needed to keep the person safe.  What are the causes?  The cause of this condition is not known.  What increases the risk?  The following factors may make you more likely to develop this condition:  · Having a family member with the disorder.  · Having an imbalance of certain chemicals in the brain (neurotransmitters).  · Experiencing stress, such as illness, financial problems, or a death.  · Having certain conditions that affect the brain or spinal cord (neurologic conditions).  · Having had a brain injury (trauma).  What are the signs or symptoms?  Symptoms of tio include:  · Very high self-esteem or self-confidence.  · Decreased need for sleep.  · Unusual talkativeness. Speech may be very fast.  · Racing thoughts with quick shifts between topics that may or may not be related (flight of ideas).  · Ability to concentrate either greatly improved or decreased.  · Increased purposeful activity, such as work, studies, or social activity.  · Increased agitation. This could be pacing, squirming, fidgeting, or finger and toe tapping.  · Impulsive behavior and poor judgment. This may result in high-risk activities that are sexual, financial, or physical.  Symptoms of depression include:  · Extreme degrees of sadness, uncontrollable crying, hopelessness, worthlessness, or numbness.  · Sleep problems, such as insomnia, waking early, or sleeping too much.  · No longer enjoying things you used to enjoy.  · Isolation. You may often spend time alone.  · Lack of energy or motivation, and moving more slowly than  normal.  · Trouble making decisions.  · Increased appetite or loss of appetite.  · Thoughts of death, or wanting to harm yourself.  Sometimes, you may have a mixed mood. This means having symptoms of tio and depression at the same time. Stress can often trigger these symptoms.  How is this diagnosed?  This condition may be diagnosed based on:  · Emotional episodes.  · Medical history.  · Use of alcohol, drugs, and prescription medicines. Certain medical conditions and substances can cause symptoms that seem like bipolar disorder. This is called secondary bipolar disorder.  Your health care provider may ask you to take a short test. This helps to understand your symptoms. You may also be asked to see a mental health specialist to follow up on this diagnosis and start treatment.  How is this treated?         This condition is a long-term (chronic) illness. It is often managed with ongoing treatment rather than treatment only when symptoms occur. A combination of treatments is the main approach. Treatment may include:  · Medicine. Medicine can be prescribed by a health care provider who specializes in treating mental health disorders (psychiatrist). Medicines called mood stabilizers are usually prescribed. If symptoms occur during treatment with a mood stabilizer, other medicines may be added.  · Psychotherapy. Some forms of talk therapy, such as cognitive behavioral therapy (CBT) and family therapy, can help with learning to manage bipolar disorder.  · Psychoeducation. This helps you and others understand how this disorder is managed. Include friends and family in educational sessions so they learn how best to support you.  · Methods of managing your condition, such as journaling or relaxation exercises. Relaxation exercises include:  ? Yoga.  ? Meditation.  ? Deep breathing.  · Lifestyle changes, such as:  ? Limiting alcohol and drug use.  ? Exercising regularly.  ? Structuring when you go to bed and get  up.  ? Eating a healthy diet.  · Electroconvulsive therapy (ECT). This is a procedure in which electricity is applied to the brain through the scalp. It may be used in cases of severe bipolar disorder when medicine and psychotherapy work too slowly or do not work.  Follow these instructions at home:  Activity  · Return to your normal activities as told by your health care provider.  · Find activities that you enjoy, and make time to do them.  · Exercise regularly as told by your health care provider.  Lifestyle    · Follow a set schedule for eating and sleeping.  · Eat a healthy diet that includes fresh fruits and vegetables, whole grains, low-fat dairy, and lean meat.  · Get at least 7-8 hours of sleep each night.  · Avoid using products that contain nicotine or tobacco. If you want help quitting, ask your health care provider.  · Do not use drugs.  Alcohol use  · Do not drink alcohol if:  ? Your health care provider tells you not to drink.  ? You are pregnant, may be pregnant, or are planning to become pregnant.  · If you drink alcohol:  ? Limit how much you use to:  § 0-1 drink a day for women.  § 0-2 drinks a day for men.  ? Be aware of how much alcohol is in your drink. In the [place] one drink equals one 12 oz bottle of beer (355 mL), one 5 oz glass of wine (148 mL), or one 1½ oz glass of hard liquor (44 mL).  General instructions  · Take over-the-counter and prescription medicines only as told by your health care provider. You may think about stopping your medicine, but it is very important to take all your medicine as prescribed.  · Consider joining a support group. Your health care provider may be able to recommend one.  · Talk with your family and friends about your treatment goals and how they can help.  · Keep all follow-up visits as told by your health care provider. This is important.  Where to find more information  · National [place] on Mental Illness: www.[place].org  · National [place] of Mental  Health: www.[facility].gov  Contact a health care provider if:  · Your symptoms get worse, or your loved ones tell you that your symptoms are getting worse.  · You have uncomfortable side effects from your medicine.  · You have trouble sleeping.  · You have trouble doing daily activities.  · You feel unsafe in your surroundings.  · You are self-medicating with alcohol or drugs.  Get help right away if:  · You have new symptoms.  · You have thoughts about harming yourself or others.  · You are considering suicide.  If you ever feel like you may hurt yourself or others, or have thoughts about taking your own life, get help right away. You can go to your nearest emergency department or call:  · Your local emergency services (911 in the [place]  · A suicide crisis helpline, such as the National Suicide Prevention Lifeline at 1-238.104.8997. This is open 24 hours a day.  Summary  · Bipolar 1 disorder is a lifelong mental health disorder in which a person has episodes of tio and depression.  · This disorder is mainly treated with a combination of medicines, talk and behavioral therapies, and, often, electroconvulsive therapy (ECT).  · Include friends and family in educational sessions so they know how best to support you.  · Get help right away if you are considering suicide.  This information is not intended to replace advice given to you by your health care provider. Make sure you discuss any questions you have with your health care provider.  Document Revised: 06/02/2020 Document Reviewed: 06/02/2020  [employer] Patient Education © 2020 [employer]

## 2021-01-14 ENCOUNTER — LAB REQUISITION (OUTPATIENT)
Dept: LAB | Facility: HOSPITAL | Age: 71
End: 2021-01-14

## 2021-01-14 DIAGNOSIS — Z00.00 ENCOUNTER FOR GENERAL ADULT MEDICAL EXAMINATION WITHOUT ABNORMAL FINDINGS: ICD-10-CM

## 2021-01-14 LAB
ALBUMIN SERPL-MCNC: 4.7 G/DL (ref 3.5–5.2)
ALBUMIN/GLOB SERPL: 2.6 G/DL
ALP SERPL-CCNC: 48 U/L (ref 39–117)
ALT SERPL W P-5'-P-CCNC: 17 U/L (ref 1–41)
ANION GAP SERPL CALCULATED.3IONS-SCNC: 8 MMOL/L (ref 5–15)
AST SERPL-CCNC: 20 U/L (ref 1–40)
BASOPHILS # BLD AUTO: 0 10*3/MM3 (ref 0–0.2)
BASOPHILS NFR BLD AUTO: 0.5 % (ref 0–1.5)
BILIRUB SERPL-MCNC: 0.8 MG/DL (ref 0–1.2)
BUN SERPL-MCNC: 14 MG/DL (ref 8–23)
BUN/CREAT SERPL: 12.3 (ref 7–25)
CALCIUM SPEC-SCNC: 9.5 MG/DL (ref 8.6–10.5)
CHLORIDE SERPL-SCNC: 101 MMOL/L (ref 98–107)
CO2 SERPL-SCNC: 27 MMOL/L (ref 22–29)
CREAT SERPL-MCNC: 1.14 MG/DL (ref 0.76–1.27)
DEPRECATED RDW RBC AUTO: 46.8 FL (ref 37–54)
EOSINOPHIL # BLD AUTO: 0.2 10*3/MM3 (ref 0–0.4)
EOSINOPHIL NFR BLD AUTO: 3.3 % (ref 0.3–6.2)
ERYTHROCYTE [DISTWIDTH] IN BLOOD BY AUTOMATED COUNT: 14.1 % (ref 12.3–15.4)
GFR SERPL CREATININE-BSD FRML MDRD: 64 ML/MIN/1.73
GLOBULIN UR ELPH-MCNC: 1.8 GM/DL
GLUCOSE SERPL-MCNC: 103 MG/DL (ref 65–99)
HCT VFR BLD AUTO: 42.9 % (ref 37.5–51)
HGB BLD-MCNC: 14.3 G/DL (ref 13–17.7)
LYMPHOCYTES # BLD AUTO: 1.2 10*3/MM3 (ref 0.7–3.1)
LYMPHOCYTES NFR BLD AUTO: 20.1 % (ref 19.6–45.3)
MCH RBC QN AUTO: 31.9 PG (ref 26.6–33)
MCHC RBC AUTO-ENTMCNC: 33.4 G/DL (ref 31.5–35.7)
MCV RBC AUTO: 95.6 FL (ref 79–97)
MONOCYTES # BLD AUTO: 0.5 10*3/MM3 (ref 0.1–0.9)
MONOCYTES NFR BLD AUTO: 7.8 % (ref 5–12)
NEUTROPHILS NFR BLD AUTO: 4.1 10*3/MM3 (ref 1.7–7)
NEUTROPHILS NFR BLD AUTO: 68.3 % (ref 42.7–76)
NRBC BLD AUTO-RTO: 0.1 /100 WBC (ref 0–0.2)
PLATELET # BLD AUTO: 185 10*3/MM3 (ref 140–450)
PMV BLD AUTO: 7.3 FL (ref 6–12)
POTASSIUM SERPL-SCNC: 4.2 MMOL/L (ref 3.5–5.2)
PROT SERPL-MCNC: 6.5 G/DL (ref 6–8.5)
RBC # BLD AUTO: 4.49 10*6/MM3 (ref 4.14–5.8)
SODIUM SERPL-SCNC: 136 MMOL/L (ref 136–145)
WBC # BLD AUTO: 5.9 10*3/MM3 (ref 3.4–10.8)

## 2021-01-14 PROCEDURE — 85025 COMPLETE CBC W/AUTO DIFF WBC: CPT | Performed by: NURSE PRACTITIONER

## 2021-01-14 PROCEDURE — 80053 COMPREHEN METABOLIC PANEL: CPT | Performed by: NURSE PRACTITIONER

## 2021-03-25 ENCOUNTER — OFFICE VISIT (OUTPATIENT)
Dept: PSYCHIATRY | Facility: CLINIC | Age: 71
End: 2021-03-25

## 2021-03-25 DIAGNOSIS — F51.05 INSOMNIA DUE TO MENTAL CONDITION: Chronic | ICD-10-CM

## 2021-03-25 DIAGNOSIS — F41.1 GENERALIZED ANXIETY DISORDER: Chronic | ICD-10-CM

## 2021-03-25 DIAGNOSIS — Z79.899 ENCOUNTER FOR LONG-TERM (CURRENT) USE OF OTHER MEDICATIONS: ICD-10-CM

## 2021-03-25 DIAGNOSIS — F31.32 BIPOLAR I DISORDER, MODERATE, CURRENT OR MOST RECENT EPISODE DEPRESSED, WITH ANXIOUS DISTRESS (HCC): Primary | Chronic | ICD-10-CM

## 2021-03-25 PROCEDURE — 99214 OFFICE O/P EST MOD 30 MIN: CPT | Performed by: PSYCHIATRY & NEUROLOGY

## 2021-03-25 RX ORDER — BUPROPION HYDROCHLORIDE 300 MG/1
300 TABLET ORAL EVERY MORNING
Qty: 90 TABLET | Refills: 1 | Status: SHIPPED | OUTPATIENT
Start: 2021-03-25 | End: 2021-06-23 | Stop reason: DRUGHIGH

## 2021-03-25 RX ORDER — RISPERIDONE 2 MG/1
2 TABLET ORAL NIGHTLY
Qty: 90 TABLET | Refills: 1 | Status: SHIPPED | OUTPATIENT
Start: 2021-03-25 | End: 2021-06-23 | Stop reason: SDUPTHER

## 2021-03-25 RX ORDER — BUPROPION HYDROCHLORIDE 150 MG/1
150 TABLET ORAL EVERY MORNING
Qty: 30 TABLET | Refills: 2 | Status: SHIPPED | OUTPATIENT
Start: 2021-03-25 | End: 2021-06-23 | Stop reason: SDUPTHER

## 2021-03-25 RX ORDER — LITHIUM CARBONATE 450 MG
450 TABLET, EXTENDED RELEASE ORAL 3 TIMES DAILY
Qty: 270 TABLET | Refills: 1 | Status: SHIPPED | OUTPATIENT
Start: 2021-03-25 | End: 2021-06-23 | Stop reason: SDUPTHER

## 2021-03-25 NOTE — PROGRESS NOTES
"Subjective   Sebas Rouse is a 70 y.o. male who presents today for follow up    Chief Complaint: depression anxiety     History of Present Illness: the pt suffered from bipolar  depression for 20 years, was stable on wellbutrin and lorazepam for many years.  [facility] was not wiling to prescribe l[facility]azepam anymore   He was stable on Li and Wellbutrin , lorazepam   He is on Li 900 QAM and 450 mg QHS , Li level was checked in Sept - Oct and it was WNL, stated he was always in therapeutic range for al those years     Usually his mood is stable, but when he has not many things to do, he is getting fidgety, constantly thinking about what he needs to do, thinking about other staff   Sleep is fair on lorazepam  The pt denied feeling hopeless/helpless, denied AVH/SI/HI now     No panic attacks     Today the pt reported feeling good, but his daughter (on facetime ) thinks he is not and wanted to face time   Daughter thought \"he was obsessed  With bathroom and was doing things he should not have \"  She thinks he is going into manic stage     The following portions of the patient's history were reviewed and updated as appropriate: allergies, current medications, past family history, past medical history, past social history, past surgical history and problem list.    PAST PSYCHIATRIC HISTORY  Axis I  Affective/Bipolar Disorder, Anxiety/Panic Disorder  2002 [place], at that time he was wandering , walking a lot, police brought hin to the hospital , he was also experiencing AVH  Axis II  Defer     PAST OUTPATIENT TREATMENT  Diagnosis treated:  Affective Disorder, Anxiety/Panic Disorder  Treatment Type:  Psychotherapy, Medication Management  Prior Psychiatric Medications:  abilify - side effects  lexapro - not effective   seroquel - sedation , weight gain     Support Groups:  None   Sequelae Of Mental Disorder:  emotional distress          Interval History  Slightly hyperactive     Side Effects  Denied "       Past Medical History:  Past Medical History:   Diagnosis Date   • AAA (abdominal aortic aneurysm) (CMS/HCC) 2019   • Cancer (CMS/HCC)    • Depression    • GERD (gastroesophageal reflux disease)    • H/O complete eye exam 10/2016   • Headache    • Headache, tension-type    • Headache, tension-type    • HL (hearing loss)    • Migraine    • Panic attack    • Sleep apnea    • TB (pulmonary tuberculosis)    • Ulnar nerve compression, left 10/6/2017       Social History:  Social History     Socioeconomic History   • Marital status:      Spouse name: Not on file   • Number of children: Not on file   • Years of education: Not on file   • Highest education level: Not on file   Tobacco Use   • Smoking status: Former Smoker     Packs/day: 4.00     Years: 35.00     Pack years: 140.00     Start date: 1966     Quit date: 2002     Years since quittin.2   • Smokeless tobacco: Never Used   • Tobacco comment: didn't enjoy it   Substance and Sexual Activity   • Alcohol use: No   • Drug use: No   • Sexual activity: Never     The pt lives with daughter   2 years in the  service in [facility]     Family History:  Family History   Problem Relation Age of Onset   • Heart disease Other    • Hypertension Other    • Hyperlipidemia Other    • Leukemia Other    • Depression Other    • COPD Other    • Diabetes Other    • Migraines Mother        Past Surgical History:  Past Surgical History:   Procedure Laterality Date   • APPENDECTOMY     • COLONOSCOPY     • SHOULDER ARTHROSCOPY      RIGHT ARM       Problem List:  Patient Active Problem List   Diagnosis   • Bipolar I disorder, moderate, current or most recent episode depressed, with anxious distress (CMS/HCC)   • Fatty liver   • Nonintractable episodic headache   • Ulnar nerve compression, left   • CLL/SLL   • Pulmonary emboli (CMS/HCC)   • AAA (abdominal aortic aneurysm) (CMS/HCC)   • Generalized anxiety disorder   • Insomnia due to mental condition        Allergy:   No Known Allergies     Discontinued Medications:  Medications Discontinued During This Encounter   Medication Reason   • buPROPion XL (WELLBUTRIN XL) 300 MG 24 hr tablet Reorder   • risperiDONE (RisperDAL) 2 MG tablet Reorder   • lithium (ESKALITH) 450 MG CR tablet Reorder       Current Medications:   Current Outpatient Medications   Medication Sig Dispense Refill   • buPROPion XL (Wellbutrin XL) 150 MG 24 hr tablet Take 1 tablet by mouth Every Morning. 30 tablet 2   • buPROPion XL (WELLBUTRIN XL) 300 MG 24 hr tablet Take 1 tablet by mouth Every Morning. 90 tablet 1   • butalbital-acetaminophen-caffeine-codeine (FIORICET/CODEINE) -31-30 MG per capsule Take 1 capsule by mouth Every 4 (Four) Hours As Needed for Headache. 30 capsule 5   • diclofenac (VOLTAREN) 1 % gel gel Apply 4 g topically 4 (Four) Times a Day As Needed.     • ketoconazole (NIZORAL) 2 % shampoo Apply  topically 2 (Two) Times a Week.     • lithium (ESKALITH) 450 MG CR tablet Take 1 tablet by mouth 3 (Three) Times a Day. 270 tablet 1   • Loratadine 10 MG capsule Take  by mouth.     • LORazepam (ATIVAN) 0.5 MG tablet Take 3 tablets by mouth Every Night. 90 tablet 2   • Omega-3 Fatty Acids (FISH OIL) 1000 MG capsule capsule Take  by mouth 3 (Three) Times a Day With Meals.     • omeprazole (priLOSEC) 20 MG capsule Take 20 mg by mouth Daily.     • risperiDONE (RisperDAL) 2 MG tablet Take 1 tablet by mouth Every Night. 90 tablet 1   • rivaroxaban (XARELTO) 20 MG tablet Take 20 mg by mouth Daily.     • Testosterone Cypionate (DEPOTESTOTERONE CYPIONATE) 200 MG/ML injection      • triamcinolone (KENALOG) 0.1 % cream        No current facility-administered medications for this visit.         Psychological ROS: positive for - anxiety  negative for - depression, disorientation, hallucinations, obsessive thoughts or suicidal ideation      Physical Exam:   There were no vitals taken for this visit.    Mental Status Exam:   Hygiene:    good  Cooperation:  Cooperative  Eye Contact:  Good  Psychomotor Behavior:  Appropriate  Affect:  Blunted  Mood: fluctates  Hopelessness: Denies  Speech:  Normal  Thought Process:  Goal directed  Thought Content:  Normal and Mood congruent  Suicidal:  None  Homicidal:  None  Hallucinations:  None  Delusion:  None  Memory:  Intact  Orientation:  Person, Place, Time and Situation  Reliability:  good  Insight:  Good  Judgement:  Good  Impulse Control:  Fair  Physical/Medical Issues:  Yes       MSE from 12/23/2020 reviewed and accepted with changes     PHQ-9 Depression Screening  Little interest or pleasure in doing things? 1   Feeling down, depressed, or hopeless? 1   Trouble falling or staying asleep, or sleeping too much? 0   Feeling tired or having little energy? 1   Poor appetite or overeating? 0   Feeling bad about yourself - or that you are a failure or have let yourself or your family down? 1   Trouble concentrating on things, such as reading the newspaper or watching television? 1   Moving or speaking so slowly that other people could have noticed? Or the opposite - being so fidgety or restless that you have been moving around a lot more than usual? 0   Thoughts that you would be better off dead, or of hurting yourself in some way? 0   PHQ-9 Total Score 5   If you checked off any problems, how difficult have these problems made it for you to do your work, take care of things at home, or get along with other people? Somewhat difficult           Former smoker    I advised Sebas of the risks of tobacco use.     Lab Results:   Lab Requisition on 01/14/2021   Component Date Value Ref Range Status   • Glucose 01/14/2021 103* 65 - 99 mg/dL Final   • BUN 01/14/2021 14  8 - 23 mg/dL Final   • Creatinine 01/14/2021 1.14  0.76 - 1.27 mg/dL Final   • Sodium 01/14/2021 136  136 - 145 mmol/L Final   • Potassium 01/14/2021 4.2  3.5 - 5.2 mmol/L Final   • Chloride 01/14/2021 101  98 - 107 mmol/L Final   • CO2 01/14/2021 27.0   22.0 - 29.0 mmol/L Final   • Calcium 01/14/2021 9.5  8.6 - 10.5 mg/dL Final   • Total Protein 01/14/2021 6.5  6.0 - 8.5 g/dL Final   • Albumin 01/14/2021 4.70  3.50 - 5.20 g/dL Final   • ALT (SGPT) 01/14/2021 17  1 - 41 U/L Final   • AST (SGOT) 01/14/2021 20  1 - 40 U/L Final   • Alkaline Phosphatase 01/14/2021 48  39 - 117 U/L Final   • Total Bilirubin 01/14/2021 0.8  0.0 - 1.2 mg/dL Final   • eGFR Non  Amer 01/14/2021 64  >60 mL/min/1.73 Final   • Globulin 01/14/2021 1.8  gm/dL Final   • A/G Ratio 01/14/2021 2.6  g/dL Final   • BUN/Creatinine Ratio 01/14/2021 12.3  7.0 - 25.0 Final   • Anion Gap 01/14/2021 8.0  5.0 - 15.0 mmol/L Final   • WBC 01/14/2021 5.90  3.40 - 10.80 10*3/mm3 Final   • RBC 01/14/2021 4.49  4.14 - 5.80 10*6/mm3 Final   • Hemoglobin 01/14/2021 14.3  13.0 - 17.7 g/dL Final   • Hematocrit 01/14/2021 42.9  37.5 - 51.0 % Final   • MCV 01/14/2021 95.6  79.0 - 97.0 fL Final   • MCH 01/14/2021 31.9  26.6 - 33.0 pg Final   • MCHC 01/14/2021 33.4  31.5 - 35.7 g/dL Final   • RDW 01/14/2021 14.1  12.3 - 15.4 % Final   • RDW-SD 01/14/2021 46.8  37.0 - 54.0 fl Final   • MPV 01/14/2021 7.3  6.0 - 12.0 fL Final   • Platelets 01/14/2021 185  140 - 450 10*3/mm3 Final   • Neutrophil % 01/14/2021 68.3  42.7 - 76.0 % Final   • Lymphocyte % 01/14/2021 20.1  19.6 - 45.3 % Final   • Monocyte % 01/14/2021 7.8  5.0 - 12.0 % Final   • Eosinophil % 01/14/2021 3.3  0.3 - 6.2 % Final   • Basophil % 01/14/2021 0.5  0.0 - 1.5 % Final   • Neutrophils, Absolute 01/14/2021 4.10  1.70 - 7.00 10*3/mm3 Final   • Lymphocytes, Absolute 01/14/2021 1.20  0.70 - 3.10 10*3/mm3 Final   • Monocytes, Absolute 01/14/2021 0.50  0.10 - 0.90 10*3/mm3 Final   • Eosinophils, Absolute 01/14/2021 0.20  0.00 - 0.40 10*3/mm3 Final   • Basophils, Absolute 01/14/2021 0.00  0.00 - 0.20 10*3/mm3 Final   • nRBC 01/14/2021 0.1  0.0 - 0.2 /100 WBC Final       Assessment/Plan   Problems Addressed this Visit        Mental Health    Generalized anxiety  disorder (Chronic)    Relevant Medications    lithium (ESKALITH) 450 MG CR tablet    risperiDONE (RisperDAL) 2 MG tablet    buPROPion XL (WELLBUTRIN XL) 300 MG 24 hr tablet    buPROPion XL (Wellbutrin XL) 150 MG 24 hr tablet    Insomnia due to mental condition (Chronic)    Relevant Medications    lithium (ESKALITH) 450 MG CR tablet    risperiDONE (RisperDAL) 2 MG tablet    buPROPion XL (WELLBUTRIN XL) 300 MG 24 hr tablet    buPROPion XL (Wellbutrin XL) 150 MG 24 hr tablet    Bipolar I disorder, moderate, current or most recent episode depressed, with anxious distress (CMS/HCC) - Primary    Relevant Medications    lithium (ESKALITH) 450 MG CR tablet    risperiDONE (RisperDAL) 2 MG tablet    buPROPion XL (WELLBUTRIN XL) 300 MG 24 hr tablet    buPROPion XL (Wellbutrin XL) 150 MG 24 hr tablet    Other Relevant Orders    Lithium Level    TSH      Other Visit Diagnoses     Encounter for long-term (current) use of other medications        Relevant Orders    Lithium Level    TSH      Diagnoses       Codes Comments    Bipolar I disorder, moderate, current or most recent episode depressed, with anxious distress (CMS/HCC)    -  Primary ICD-10-CM: F31.32  ICD-9-CM: 296.52     Generalized anxiety disorder     ICD-10-CM: F41.1  ICD-9-CM: 300.02     Insomnia due to mental condition     ICD-10-CM: F51.05  ICD-9-CM: 300.9, 327.02     Encounter for long-term (current) use of other medications     ICD-10-CM: Z79.899  ICD-9-CM: V58.69           Visit Diagnoses:    ICD-10-CM ICD-9-CM   1. Bipolar I disorder, moderate, current or most recent episode depressed, with anxious distress (CMS/HCC)  F31.32 296.52   2. Generalized anxiety disorder  F41.1 300.02   3. Insomnia due to mental condition  F51.05 300.9     327.02   4. Encounter for long-term (current) use of other medications  Z79.899 V58.69       TREATMENT PLAN/GOALS: Continue supportive psychotherapy efforts and medications as indicated. Treatment and medication options discussed  during today's visit. Patient ackowledged and verbally consented to continue with current treatment plan and was educated on the importance of compliance with treatment and follow-up appointments.    MEDICATION ISSUES:  1. Bipolar d/o depressed, moderate - cont L, Risperidone, decrease wellbutirn to 150 for 1-2 weeks to control hypomanic sxs , the pt stated he is bored at home and wants to do something, planning activity was discussed with the pt and his daughter     2. Generalied anxiety d/o  - cont lorazepam from PCP     3. Insomnia - sleep hygiene     INSPECT reviewed as expected 2/27/2021  lorazepam  From PCP   PHQ scored 5 - mild depression     The pt was stable on current meds, previous GDR attempts failed, the pt was gettng unstable   Long term benzo use discussed with the pt and his daughter , they verbalized understanding   And were willing to continue meds     Discussed medication options and treatment plan of prescribed medication as well as the risks, benefits, and side effects including potential falls, possible impaired driving and metabolic adversities among others. Patient is agreeable to call the office with any worsening of symptoms or onset of side effects. Patient is agreeable to call 911 or go to the nearest ER should he/she begin having SI/HI. No medication side effects or related complaints today.     MEDS ORDERED DURING VISIT:  New Medications Ordered This Visit   Medications   • lithium (ESKALITH) 450 MG CR tablet     Sig: Take 1 tablet by mouth 3 (Three) Times a Day.     Dispense:  270 tablet     Refill:  1   • risperiDONE (RisperDAL) 2 MG tablet     Sig: Take 1 tablet by mouth Every Night.     Dispense:  90 tablet     Refill:  1   • buPROPion XL (WELLBUTRIN XL) 300 MG 24 hr tablet     Sig: Take 1 tablet by mouth Every Morning.     Dispense:  90 tablet     Refill:  1   • buPROPion XL (Wellbutrin XL) 150 MG 24 hr tablet     Sig: Take 1 tablet by mouth Every Morning.     Dispense:  30 tablet      Refill:  2       Return in about 3 months (around 6/25/2021).         This document has been electronically signed by Elizabeth Watkins MD  March 25, 2021 09:09 EDT    EMR Dragon transcription disclaimer:  Some of this encounter note is an electronic transcription translation of spoken language to printed text. The electronic translation of spoken language may permit erroneous, or at times, nonsensical words or phrases to be inadvertently transcribed; Although I have reviewed the note for such errors some may still exist.

## 2021-03-25 NOTE — PATIENT INSTRUCTIONS
Bipolar 1 Disorder  Bipolar 1 disorder is a mental health disorder in which a person has episodes of emotional highs, or tio, and may also have episodes of lows, or depression. Bipolar 1 disorder is different from other bipolar disorders in that it involves extreme episodes of tio (manic episodes). These episodes last at least one week or involve symptoms that are so severe that hospitalization is needed to keep the person safe.  What are the causes?  The cause of this condition is not known.  What increases the risk?  The following factors may make you more likely to develop this condition:  · Having a family member with the disorder.  · Having an imbalance of certain chemicals in the brain (neurotransmitters).  · Experiencing stress, such as illness, financial problems, or a death.  · Having certain conditions that affect the brain or spinal cord (neurologic conditions).  · Having had a brain injury (trauma).  What are the signs or symptoms?  Symptoms of tio include:  · Very high self-esteem or self-confidence.  · Decreased need for sleep.  · Unusual talkativeness. Speech may be very fast.  · Racing thoughts with quick shifts between topics that may or may not be related (flight of ideas).  · Ability to concentrate either greatly improved or decreased.  · Increased purposeful activity, such as work, studies, or social activity.  · Increased agitation. This could be pacing, squirming, fidgeting, or finger and toe tapping.  · Impulsive behavior and poor judgment. This may result in high-risk activities that are sexual, financial, or physical.  Symptoms of depression include:  · Extreme degrees of sadness, uncontrollable crying, hopelessness, worthlessness, or numbness.  · Sleep problems, such as insomnia, waking early, or sleeping too much.  · No longer enjoying things you used to enjoy.  · Isolation. You may often spend time alone.  · Lack of energy or motivation, and moving more slowly than  normal.  · Trouble making decisions.  · Increased appetite or loss of appetite.  · Thoughts of death, or wanting to harm yourself.  Sometimes, you may have a mixed mood. This means having symptoms of tio and depression at the same time. Stress can often trigger these symptoms.  How is this diagnosed?  This condition may be diagnosed based on:  · Emotional episodes.  · Medical history.  · Use of alcohol, drugs, and prescription medicines. Certain medical conditions and substances can cause symptoms that seem like bipolar disorder. This is called secondary bipolar disorder.  Your health care provider may ask you to take a short test. This helps to understand your symptoms. You may also be asked to see a mental health specialist to follow up on this diagnosis and start treatment.  How is this treated?         This condition is a long-term (chronic) illness. It is often managed with ongoing treatment rather than treatment only when symptoms occur. A combination of treatments is the main approach. Treatment may include:  · Medicine. Medicine can be prescribed by a health care provider who specializes in treating mental health disorders (psychiatrist). Medicines called mood stabilizers are usually prescribed. If symptoms occur during treatment with a mood stabilizer, other medicines may be added.  · Psychotherapy. Some forms of talk therapy, such as cognitive behavioral therapy (CBT) and family therapy, can help with learning to manage bipolar disorder.  · Psychoeducation. This helps you and others understand how this disorder is managed. Include friends and family in educational sessions so they learn how best to support you.  · Methods of managing your condition, such as journaling or relaxation exercises. Relaxation exercises include:  ? Yoga.  ? Meditation.  ? Deep breathing.  · Lifestyle changes, such as:  ? Limiting alcohol and drug use.  ? Exercising regularly.  ? Structuring when you go to bed and get  up.  ? Eating a healthy diet.  · Electroconvulsive therapy (ECT). This is a procedure in which electricity is applied to the brain through the scalp. It may be used in cases of severe bipolar disorder when medicine and psychotherapy work too slowly or do not work.  Follow these instructions at home:  Activity  · Return to your normal activities as told by your health care provider.  · Find activities that you enjoy, and make time to do them.  · Exercise regularly as told by your health care provider.  Lifestyle    · Follow a set schedule for eating and sleeping.  · Eat a healthy diet that includes fresh fruits and vegetables, whole grains, low-fat dairy, and lean meat.  · Get at least 7-8 hours of sleep each night.  · Avoid using products that contain nicotine or tobacco. If you want help quitting, ask your health care provider.  · Do not use drugs.  Alcohol use  · Do not drink alcohol if:  ? Your health care provider tells you not to drink.  ? You are pregnant, may be pregnant, or are planning to become pregnant.  · If you drink alcohol:  ? Limit how much you use to:  § 0-1 drink a day for women.  § 0-2 drinks a day for men.  ? Be aware of how much alcohol is in your drink. In the [place] one drink equals one 12 oz bottle of beer (355 mL), one 5 oz glass of wine (148 mL), or one 1½ oz glass of hard liquor (44 mL).  General instructions  · Take over-the-counter and prescription medicines only as told by your health care provider. You may think about stopping your medicine, but it is very important to take all your medicine as prescribed.  · Consider joining a support group. Your health care provider may be able to recommend one.  · Talk with your family and friends about your treatment goals and how they can help.  · Keep all follow-up visits as told by your health care provider. This is important.  Where to find more information  · National [place] on Mental Illness: www.[place].org  · National [place] of Mental  Health: www.[facility].gov  Contact a health care provider if:  · Your symptoms get worse, or your loved ones tell you that your symptoms are getting worse.  · You have uncomfortable side effects from your medicine.  · You have trouble sleeping.  · You have trouble doing daily activities.  · You feel unsafe in your surroundings.  · You are self-medicating with alcohol or drugs.  Get help right away if:  · You have new symptoms.  · You have thoughts about harming yourself or others.  · You are considering suicide.  If you ever feel like you may hurt yourself or others, or have thoughts about taking your own life, get help right away. You can go to your nearest emergency department or call:  · Your local emergency services (911 in the [place]  · A suicide crisis helpline, such as the National Suicide Prevention Lifeline at 1-711.534.7163. This is open 24 hours a day.  Summary  · Bipolar 1 disorder is a lifelong mental health disorder in which a person has episodes of tio and depression.  · This disorder is mainly treated with a combination of medicines, talk and behavioral therapies, and, often, electroconvulsive therapy (ECT).  · Include friends and family in educational sessions so they know how best to support you.  · Get help right away if you are considering suicide.  This information is not intended to replace advice given to you by your health care provider. Make sure you discuss any questions you have with your health care provider.  Document Revised: 06/02/2020 Document Reviewed: 06/02/2020  [employer] Patient Education © 2021 [employer]

## 2021-04-07 ENCOUNTER — LAB (OUTPATIENT)
Dept: LAB | Facility: HOSPITAL | Age: 71
End: 2021-04-07

## 2021-04-07 DIAGNOSIS — F31.32 BIPOLAR I DISORDER, MODERATE, CURRENT OR MOST RECENT EPISODE DEPRESSED, WITH ANXIOUS DISTRESS (HCC): Chronic | ICD-10-CM

## 2021-04-07 DIAGNOSIS — Z79.899 ENCOUNTER FOR LONG-TERM (CURRENT) USE OF OTHER MEDICATIONS: ICD-10-CM

## 2021-04-07 LAB
LITHIUM SERPL-SCNC: 1.3 MMOL/L (ref 0.6–1.2)
TSH SERPL DL<=0.05 MIU/L-ACNC: 1.32 UIU/ML (ref 0.27–4.2)

## 2021-04-07 PROCEDURE — 36415 COLL VENOUS BLD VENIPUNCTURE: CPT

## 2021-04-07 PROCEDURE — 84443 ASSAY THYROID STIM HORMONE: CPT

## 2021-04-07 PROCEDURE — 80178 ASSAY OF LITHIUM: CPT

## 2021-04-09 DIAGNOSIS — Z79.899 ENCOUNTER FOR LONG-TERM (CURRENT) USE OF OTHER MEDICATIONS: Primary | ICD-10-CM

## 2021-05-04 ENCOUNTER — LAB (OUTPATIENT)
Dept: LAB | Facility: HOSPITAL | Age: 71
End: 2021-05-04

## 2021-05-04 DIAGNOSIS — Z79.899 ENCOUNTER FOR LONG-TERM (CURRENT) USE OF OTHER MEDICATIONS: ICD-10-CM

## 2021-05-04 LAB — LITHIUM SERPL-SCNC: 0.8 MMOL/L (ref 0.6–1.2)

## 2021-05-04 PROCEDURE — 80178 ASSAY OF LITHIUM: CPT

## 2021-05-04 PROCEDURE — 36415 COLL VENOUS BLD VENIPUNCTURE: CPT

## 2021-06-23 ENCOUNTER — OFFICE VISIT (OUTPATIENT)
Dept: PSYCHIATRY | Facility: CLINIC | Age: 71
End: 2021-06-23

## 2021-06-23 DIAGNOSIS — F41.1 GENERALIZED ANXIETY DISORDER: Chronic | ICD-10-CM

## 2021-06-23 DIAGNOSIS — F51.05 INSOMNIA DUE TO MENTAL CONDITION: Chronic | ICD-10-CM

## 2021-06-23 DIAGNOSIS — F31.32 BIPOLAR I DISORDER, MODERATE, CURRENT OR MOST RECENT EPISODE DEPRESSED, WITH ANXIOUS DISTRESS (HCC): Primary | ICD-10-CM

## 2021-06-23 PROCEDURE — 99214 OFFICE O/P EST MOD 30 MIN: CPT | Performed by: PSYCHIATRY & NEUROLOGY

## 2021-06-23 RX ORDER — LITHIUM CARBONATE 450 MG
450 TABLET, EXTENDED RELEASE ORAL 3 TIMES DAILY
Qty: 270 TABLET | Refills: 1 | Status: SHIPPED | OUTPATIENT
Start: 2021-06-23 | End: 2021-12-23 | Stop reason: SDUPTHER

## 2021-06-23 RX ORDER — BUPROPION HYDROCHLORIDE 150 MG/1
150 TABLET ORAL EVERY MORNING
Qty: 30 TABLET | Refills: 2 | Status: SHIPPED | OUTPATIENT
Start: 2021-06-23 | End: 2021-12-20

## 2021-06-23 RX ORDER — RISPERIDONE 2 MG/1
2 TABLET ORAL NIGHTLY
Qty: 90 TABLET | Refills: 1 | Status: SHIPPED | OUTPATIENT
Start: 2021-06-23 | End: 2021-12-23 | Stop reason: SDUPTHER

## 2021-06-23 NOTE — PATIENT INSTRUCTIONS
Bipolar 1 Disorder  Bipolar 1 disorder is a mental health disorder in which a person has episodes of emotional highs, or tio, and may also have episodes of lows, or depression. Bipolar 1 disorder is different from other bipolar disorders in that it involves extreme episodes of tio (manic episodes). These episodes last at least one week or involve symptoms that are so severe that hospitalization is needed to keep the person safe.  What are the causes?  The cause of this condition is not known.  What increases the risk?  The following factors may make you more likely to develop this condition:  · Having a family member with the disorder.  · Having an imbalance of certain chemicals in the brain (neurotransmitters).  · Experiencing stress, such as illness, financial problems, or a death.  · Having certain conditions that affect the brain or spinal cord (neurologic conditions).  · Having had a brain injury (trauma).  What are the signs or symptoms?  Symptoms of tio include:  · Very high self-esteem or self-confidence.  · Decreased need for sleep.  · Unusual talkativeness. Speech may be very fast.  · Racing thoughts with quick shifts between topics that may or may not be related (flight of ideas).  · Ability to concentrate either greatly improved or decreased.  · Increased purposeful activity, such as work, studies, or social activity.  · Increased agitation. This could be pacing, squirming, fidgeting, or finger and toe tapping.  · Impulsive behavior and poor judgment. This may result in high-risk activities that are sexual, financial, or physical.  Symptoms of depression include:  · Extreme degrees of sadness, uncontrollable crying, hopelessness, worthlessness, or numbness.  · Sleep problems, such as insomnia, waking early, or sleeping too much.  · No longer enjoying things you used to enjoy.  · Isolation. You may often spend time alone.  · Lack of energy or motivation, and moving more slowly than  normal.  · Trouble making decisions.  · Increased appetite or loss of appetite.  · Thoughts of death, or wanting to harm yourself.  Sometimes, you may have a mixed mood. This means having symptoms of tio and depression at the same time. Stress can often trigger these symptoms.  How is this diagnosed?  This condition may be diagnosed based on:  · Emotional episodes.  · Medical history.  · Use of alcohol, drugs, and prescription medicines. Certain medical conditions and substances can cause symptoms that seem like bipolar disorder. This is called secondary bipolar disorder.  Your health care provider may ask you to take a short test. This helps to understand your symptoms. You may also be asked to see a mental health specialist to follow up on this diagnosis and start treatment.  How is this treated?         This condition is a long-term (chronic) illness. It is often managed with ongoing treatment rather than treatment only when symptoms occur. A combination of treatments is the main approach. Treatment may include:  · Medicine. Medicine can be prescribed by a health care provider who specializes in treating mental health disorders (psychiatrist). Medicines called mood stabilizers are usually prescribed. If symptoms occur during treatment with a mood stabilizer, other medicines may be added.  · Psychotherapy. Some forms of talk therapy, such as cognitive behavioral therapy (CBT) and family therapy, can help with learning to manage bipolar disorder.  · Psychoeducation. This helps you and others understand how this disorder is managed. Include friends and family in educational sessions so they learn how best to support you.  · Methods of managing your condition, such as journaling or relaxation exercises. Relaxation exercises include:  ? Yoga.  ? Meditation.  ? Deep breathing.  · Lifestyle changes, such as:  ? Limiting alcohol and drug use.  ? Exercising regularly.  ? Structuring when you go to bed and get  up.  ? Eating a healthy diet.  · Electroconvulsive therapy (ECT). This is a procedure in which electricity is applied to the brain through the scalp. It may be used in cases of severe bipolar disorder when medicine and psychotherapy work too slowly or do not work.  Follow these instructions at home:  Activity  · Return to your normal activities as told by your health care provider.  · Find activities that you enjoy, and make time to do them.  · Exercise regularly as told by your health care provider.  Lifestyle    · Follow a set schedule for eating and sleeping.  · Eat a healthy diet that includes fresh fruits and vegetables, whole grains, low-fat dairy, and lean meat.  · Get at least 7-8 hours of sleep each night.  · Avoid using products that contain nicotine or tobacco. If you want help quitting, ask your health care provider.  · Do not use drugs.  Alcohol use  · Do not drink alcohol if:  ? Your health care provider tells you not to drink.  ? You are pregnant, may be pregnant, or are planning to become pregnant.  · If you drink alcohol:  ? Limit how much you use to:  § 0-1 drink a day for women.  § 0-2 drinks a day for men.  ? Be aware of how much alcohol is in your drink. In the [place] one drink equals one 12 oz bottle of beer (355 mL), one 5 oz glass of wine (148 mL), or one 1½ oz glass of hard liquor (44 mL).  General instructions  · Take over-the-counter and prescription medicines only as told by your health care provider. You may think about stopping your medicine, but it is very important to take all your medicine as prescribed.  · Consider joining a support group. Your health care provider may be able to recommend one.  · Talk with your family and friends about your treatment goals and how they can help.  · Keep all follow-up visits as told by your health care provider. This is important.  Where to find more information  · National [place] on Mental Illness: www.[place].org  · National [place] of Mental  Health: www.[facility].gov  Contact a health care provider if:  · Your symptoms get worse, or your loved ones tell you that your symptoms are getting worse.  · You have uncomfortable side effects from your medicine.  · You have trouble sleeping.  · You have trouble doing daily activities.  · You feel unsafe in your surroundings.  · You are self-medicating with alcohol or drugs.  Get help right away if:  · You have new symptoms.  · You have thoughts about harming yourself or others.  · You are considering suicide.  If you ever feel like you may hurt yourself or others, or have thoughts about taking your own life, get help right away. You can go to your nearest emergency department or call:  · Your local emergency services (911 in the [place]  · A suicide crisis helpline, such as the National Suicide Prevention Lifeline at 1-531.285.2360. This is open 24 hours a day.  Summary  · Bipolar 1 disorder is a lifelong mental health disorder in which a person has episodes of tio and depression.  · This disorder is mainly treated with a combination of medicines, talk and behavioral therapies, and, often, electroconvulsive therapy (ECT).  · Include friends and family in educational sessions so they know how best to support you.  · Get help right away if you are considering suicide.  This information is not intended to replace advice given to you by your health care provider. Make sure you discuss any questions you have with your health care provider.  Document Revised: 06/02/2020 Document Reviewed: 06/02/2020  [employer] Patient Education © 2021 [employer]

## 2021-06-23 NOTE — PROGRESS NOTES
"Subjective   Sebas Rouse is a 70 y.o. male who presents today for follow up    Chief Complaint: depression anxiety     History of Present Illness: the pt suffered from bipolar  depression for 20 years, was stable on wellbutrin and lorazepam for many years.  [facility] was not wiling to prescribe [facility]azepam anymore   He was stable on Li and Wellbutrin , lorazepam     He is on Li 900 QAM and 450 mg QHS , Li level was checked in Sept - Oct and it was WNL, stated he was always in therapeutic range for al those years     Today the pt reported feeling \"more leveled\" , less impulsive after Wellbutrin was decreased to 150 mg   He is going to gym 2 times per week, enjoys time spending in the yard growing tomatoes.     Usually his mood is stable, but when he has not many things to do, he is getting fidgety, constantly thinking about what he needs to do, thinking about other staff   Sleep is fair on lorazepam  The pt denied feeling hopeless/helpless, denied AVH/SI/HI now     No panic attacks       The following portions of the patient's history were reviewed and updated as appropriate: allergies, current medications, past family history, past medical history, past social history, past surgical history and problem list.    PAST PSYCHIATRIC HISTORY  Axis I  Affective/Bipolar Disorder, Anxiety/Panic Disorder  2002 [place], at that time he was wandering , walking a lot, police brought hin to the hospital , he was also experiencing AVH  Axis II  Defer     PAST OUTPATIENT TREATMENT  Diagnosis treated:  Affective Disorder, Anxiety/Panic Disorder  Treatment Type:  Psychotherapy, Medication Management  Prior Psychiatric Medications:  abilify - side effects  lexapro - not effective   seroquel - sedation , weight gain     Support Groups:  None   Sequelae Of Mental Disorder:  emotional distress          Interval History  Improved      Side Effects  Denied       Past Medical History:  Past Medical History:   Diagnosis " Date   • AAA (abdominal aortic aneurysm) (CMS/[facility]) 2019   • Cancer (CMS/[facility])    • Depression    • GERD (gastroesophageal reflux disease)    • H/O complete eye exam 10/2016   • Headache    • Headache, tension-type    • Headache, tension-type    • HL (hearing loss)    • Migraine    • Panic attack    • Sleep apnea    • TB (pulmonary tuberculosis)    • Ulnar nerve compression, left 10/6/2017       Social History:  Social History     Socioeconomic History   • Marital status:      Spouse name: Not on file   • Number of children: Not on file   • Years of education: Not on file   • Highest education level: Not on file   Tobacco Use   • Smoking status: Former Smoker     Packs/day: 4.00     Years: 35.00     Pack years: 140.00     Start date: 1966     Quit date: 2002     Years since quittin.4   • Smokeless tobacco: Never Used   • Tobacco comment: didn't enjoy it   Substance and Sexual Activity   • Alcohol use: No   • Drug use: No   • Sexual activity: Never     The pt lives with daughter   2 years in the  service in [facility]     Family History:  Family History   Problem Relation Age of Onset   • Heart disease Other    • Hypertension Other    • Hyperlipidemia Other    • Leukemia Other    • Depression Other    • COPD Other    • Diabetes Other    • Migraines Mother        Past Surgical History:  Past Surgical History:   Procedure Laterality Date   • APPENDECTOMY     • COLONOSCOPY     • SHOULDER ARTHROSCOPY      RIGHT ARM       Problem List:  Patient Active Problem List   Diagnosis   • Bipolar I disorder, moderate, current or most recent episode depressed, with anxious distress (CMS/[facility])   • Fatty liver   • Nonintractable episodic headache   • Ulnar nerve compression, left   • CLL/SLL   • Pulmonary emboli (CMS/[facility])   • AAA (abdominal aortic aneurysm) (CMS/[facility])   • Generalized anxiety disorder   • Insomnia due to mental condition       Allergy:   No Known Allergies     Discontinued  Medications:  Medications Discontinued During This Encounter   Medication Reason   • buPROPion XL (WELLBUTRIN XL) 300 MG 24 hr tablet Dose adjustment   • lithium (ESKALITH) 450 MG CR tablet Reorder   • risperiDONE (RisperDAL) 2 MG tablet Reorder   • buPROPion XL (Wellbutrin XL) 150 MG 24 hr tablet Reorder       Current Medications:   Current Outpatient Medications   Medication Sig Dispense Refill   • buPROPion XL (Wellbutrin XL) 150 MG 24 hr tablet Take 1 tablet by mouth Every Morning. 30 tablet 2   • butalbital-acetaminophen-caffeine-codeine (FIORICET/CODEINE) -94-30 MG per capsule Take 1 capsule by mouth Every 4 (Four) Hours As Needed for Headache. 30 capsule 5   • diclofenac (VOLTAREN) 1 % gel gel Apply 4 g topically 4 (Four) Times a Day As Needed.     • ketoconazole (NIZORAL) 2 % shampoo Apply  topically 2 (Two) Times a Week.     • lithium (ESKALITH) 450 MG CR tablet Take 1 tablet by mouth 3 (Three) Times a Day. 270 tablet 1   • Loratadine 10 MG capsule Take  by mouth.     • LORazepam (ATIVAN) 0.5 MG tablet Take 3 tablets by mouth Every Night. 90 tablet 2   • Omega-3 Fatty Acids (FISH OIL) 1000 MG capsule capsule Take  by mouth 3 (Three) Times a Day With Meals.     • omeprazole (priLOSEC) 20 MG capsule Take 20 mg by mouth Daily.     • risperiDONE (RisperDAL) 2 MG tablet Take 1 tablet by mouth Every Night. 90 tablet 1   • rivaroxaban (XARELTO) 20 MG tablet Take 20 mg by mouth Daily.     • Testosterone Cypionate (DEPOTESTOTERONE CYPIONATE) 200 MG/ML injection      • triamcinolone (KENALOG) 0.1 % cream        No current facility-administered medications for this visit.         Psychological ROS: positive for - anxiety  negative for - depression, disorientation, hallucinations, obsessive thoughts or suicidal ideation      Physical Exam:   There were no vitals taken for this visit.    Mental Status Exam:   Hygiene:   good  Cooperation:  Cooperative  Eye Contact:  Good  Psychomotor Behavior:   Appropriate  Affect:  Blunted  Mood: fluctates  Hopelessness: Denies  Speech:  Normal  Thought Process:  Goal directed  Thought Content:  Normal and Mood congruent  Suicidal:  None  Homicidal:  None  Hallucinations:  None  Delusion:  None  Memory:  Intact  Orientation:  Person, Place, Time and Situation  Reliability:  good  Insight:  Good  Judgement:  Good  Impulse Control:  Fair  Physical/Medical Issues:  Yes       MSE from 3/25/2021  reviewed and no changes necessary     PHQ-9 Depression Screening  Little interest or pleasure in doing things? 2   Feeling down, depressed, or hopeless? 2   Trouble falling or staying asleep, or sleeping too much? 0   Feeling tired or having little energy? 1   Poor appetite or overeating? 1   Feeling bad about yourself - or that you are a failure or have let yourself or your family down? 2   Trouble concentrating on things, such as reading the newspaper or watching television? 1   Moving or speaking so slowly that other people could have noticed? Or the opposite - being so fidgety or restless that you have been moving around a lot more than usual? 0   Thoughts that you would be better off dead, or of hurting yourself in some way? 0   PHQ-9 Total Score 9   If you checked off any problems, how difficult have these problems made it for you to do your work, take care of things at home, or get along with other people? Very difficult           Former smoker    I advised Sebas of the risks of tobacco use.     Lab Results:   Lab on 05/04/2021   Component Date Value Ref Range Status   • Lithium 05/04/2021 0.8  0.6 - 1.2 mmol/L Final   Lab on 04/07/2021   Component Date Value Ref Range Status   • Lithium 04/07/2021 1.3* 0.6 - 1.2 mmol/L Final   • TSH 04/07/2021 1.320  0.270 - 4.200 uIU/mL Final       Assessment/Plan   Problems Addressed this Visit        Mental Health    Generalized anxiety disorder (Chronic)    Relevant Medications    lithium (ESKALITH) 450 MG CR tablet    risperiDONE  (RisperDAL) 2 MG tablet    buPROPion XL (Wellbutrin XL) 150 MG 24 hr tablet    Insomnia due to mental condition (Chronic)    Relevant Medications    lithium (ESKALITH) 450 MG CR tablet    risperiDONE (RisperDAL) 2 MG tablet    buPROPion XL (Wellbutrin XL) 150 MG 24 hr tablet    Bipolar I disorder, moderate, current or most recent episode depressed, with anxious distress (CMS/HCC) - Primary    Relevant Medications    lithium (ESKALITH) 450 MG CR tablet    risperiDONE (RisperDAL) 2 MG tablet    buPROPion XL (Wellbutrin XL) 150 MG 24 hr tablet      Diagnoses       Codes Comments    Bipolar I disorder, moderate, current or most recent episode depressed, with anxious distress (CMS/HCC)    -  Primary ICD-10-CM: F31.32  ICD-9-CM: 296.52     Generalized anxiety disorder     ICD-10-CM: F41.1  ICD-9-CM: 300.02     Insomnia due to mental condition     ICD-10-CM: F51.05  ICD-9-CM: 300.9, 327.02           Visit Diagnoses:    ICD-10-CM ICD-9-CM   1. Bipolar I disorder, moderate, current or most recent episode depressed, with anxious distress (CMS/HCC)  F31.32 296.52   2. Generalized anxiety disorder  F41.1 300.02   3. Insomnia due to mental condition  F51.05 300.9     327.02       TREATMENT PLAN/GOALS: Continue supportive psychotherapy efforts and medications as indicated. Treatment and medication options discussed during today's visit. Patient ackowledged and verbally consented to continue with current treatment plan and was educated on the importance of compliance with treatment and follow-up appointments.    MEDICATION ISSUES:  1. Bipolar d/o depressed, moderate - cont L, Risperidone, cont wellbutirn at 150  mg , cont Li 450 mg TID - Li level therapeutic at 0.8   2. Generalied anxiety d/o  - cont lorazepam from PCP     3. Insomnia - sleep hygiene     INSPECT reviewed as expected 6/7/2021  lorazepam  From PCP     PHQ scored 9 - mild depression     Labs 5/4/2021 Li level 0.8 , TSH WNL     The pt was stable on current meds,  previous GDR attempts failed, the pt was gettng unstable   Long term benzo use discussed with the pt and his daughter , they verbalized understanding   And were willing to continue meds     Discussed medication options and treatment plan of prescribed medication as well as the risks, benefits, and side effects including potential falls, possible impaired driving and metabolic adversities among others. Patient is agreeable to call the office with any worsening of symptoms or onset of side effects. Patient is agreeable to call 911 or go to the nearest ER should he/she begin having SI/HI. No medication side effects or related complaints today.     MEDS ORDERED DURING VISIT:  New Medications Ordered This Visit   Medications   • lithium (ESKALITH) 450 MG CR tablet     Sig: Take 1 tablet by mouth 3 (Three) Times a Day.     Dispense:  270 tablet     Refill:  1   • risperiDONE (RisperDAL) 2 MG tablet     Sig: Take 1 tablet by mouth Every Night.     Dispense:  90 tablet     Refill:  1   • buPROPion XL (Wellbutrin XL) 150 MG 24 hr tablet     Sig: Take 1 tablet by mouth Every Morning.     Dispense:  30 tablet     Refill:  2       Return in about 6 months (around 12/23/2021).         This document has been electronically signed by Elizabeth Watkins MD  June 23, 2021 08:52 EDT    EMR Dragon transcription disclaimer:  Some of this encounter note is an electronic transcription translation of spoken language to printed text. The electronic translation of spoken language may permit erroneous, or at times, nonsensical words or phrases to be inadvertently transcribed; Although I have reviewed the note for such errors some may still exist.   Answers for HPI/ROS submitted by the patient on 6/16/2021  What is the primary reason for your visit?: Neurological Problem  altered mental status: No  clumsiness: No  focal sensory loss: No  focal weakness: No  loss of balance: No  memory loss: Yes  near-syncope: No  slurred speech: No  syncope:  No  visual change: No  weakness: No  Chronicity: recurrent  Onset: more than 1 year ago  Onset quality: gradually  Progression since onset: waxing and waning  Focality: no focality noted  abdominal pain: No  auditory change: No  aura: No  back pain: No  bladder incontinence: Yes  bowel incontinence: No  chest pain: No  confusion: No  diaphoresis: No  dizziness: No  fatigue: No  fever: No  headaches: Yes  light-headedness: No  nausea: No  neck pain: Yes  palpitations: No  shortness of breath: No  vertigo: No  vomiting: No  Treatments tried: medication

## 2021-11-15 ENCOUNTER — TELEPHONE (OUTPATIENT)
Dept: PSYCHIATRY | Facility: CLINIC | Age: 71
End: 2021-11-15

## 2021-11-15 DIAGNOSIS — F41.1 GENERALIZED ANXIETY DISORDER: ICD-10-CM

## 2021-11-15 NOTE — TELEPHONE ENCOUNTER
Sebas is being prescribed Lorazepam by his PCP- Dr BURAK Villatoro. He is wanting tot know if you will take over prescribing it.  Local pharmacy will not accept a written rx only E-scribe. Dr. Villatoro can not send them electronically. He has 5 day left. INSPECT RAN

## 2021-11-16 RX ORDER — LORAZEPAM 0.5 MG/1
1.5 TABLET ORAL NIGHTLY
Qty: 90 TABLET | Refills: 2 | Status: SHIPPED | OUTPATIENT
Start: 2021-11-16 | End: 2021-12-23 | Stop reason: SDUPTHER

## 2021-12-20 RX ORDER — BUPROPION HYDROCHLORIDE 150 MG/1
TABLET ORAL
Qty: 30 TABLET | Refills: 2 | Status: SHIPPED | OUTPATIENT
Start: 2021-12-20 | End: 2021-12-23 | Stop reason: SDUPTHER

## 2021-12-23 ENCOUNTER — OFFICE VISIT (OUTPATIENT)
Dept: PSYCHIATRY | Facility: CLINIC | Age: 71
End: 2021-12-23

## 2021-12-23 DIAGNOSIS — Z79.899 ENCOUNTER FOR LONG-TERM (CURRENT) USE OF OTHER MEDICATIONS: ICD-10-CM

## 2021-12-23 DIAGNOSIS — F41.1 GENERALIZED ANXIETY DISORDER: Chronic | ICD-10-CM

## 2021-12-23 DIAGNOSIS — F31.32 BIPOLAR I DISORDER, MODERATE, CURRENT OR MOST RECENT EPISODE DEPRESSED, WITH ANXIOUS DISTRESS (HCC): Primary | ICD-10-CM

## 2021-12-23 DIAGNOSIS — F51.05 INSOMNIA DUE TO MENTAL CONDITION: Chronic | ICD-10-CM

## 2021-12-23 PROCEDURE — 99214 OFFICE O/P EST MOD 30 MIN: CPT | Performed by: PSYCHIATRY & NEUROLOGY

## 2021-12-23 RX ORDER — BUPROPION HYDROCHLORIDE 150 MG/1
150 TABLET ORAL EVERY MORNING
Qty: 90 TABLET | Refills: 1 | Status: SHIPPED | OUTPATIENT
Start: 2021-12-23 | End: 2022-03-23

## 2021-12-23 RX ORDER — LORAZEPAM 0.5 MG/1
1.5 TABLET ORAL NIGHTLY
Qty: 90 TABLET | Refills: 2 | Status: SHIPPED | OUTPATIENT
Start: 2021-12-23 | End: 2022-03-23 | Stop reason: SDUPTHER

## 2021-12-23 RX ORDER — LITHIUM CARBONATE 450 MG
450 TABLET, EXTENDED RELEASE ORAL 3 TIMES DAILY
Qty: 270 TABLET | Refills: 1 | Status: SHIPPED | OUTPATIENT
Start: 2021-12-23 | End: 2022-03-23 | Stop reason: SDUPTHER

## 2021-12-23 RX ORDER — RISPERIDONE 2 MG/1
2 TABLET ORAL NIGHTLY
Qty: 90 TABLET | Refills: 1 | Status: SHIPPED | OUTPATIENT
Start: 2021-12-23 | End: 2022-03-23 | Stop reason: SDUPTHER

## 2021-12-23 NOTE — PROGRESS NOTES
Subjective   Sebas Rouse is a 71 y.o. male who presents today for follow up    Chief Complaint: depression anxiety, memory issues     History of Present Illness: the pt suffered from bipolar  depression for 20 years, was stable on wellbutrin and lorazepam for many years.  [facility] not w[facility] to [facility]ep[facility] anymore   He was stable on Li and Wellbutrin , lorazepam     He is on Li 900 QAM and 450 mg QHS , Li level was checked in Sept - Oct and it was WNL, stated he was always in therapeutic range for al those years     Today the pt presented with his daughter, he likes to stay at home, but not much to do and he feels lonely, during fall ans summer - he likes to shoot outdoor . Daughter also noticed memory issues, his preoccupation with cleansing his bowels, getting upset, obsessed and stressed out about colonoscopy   The pt is on lorazepam before bed because he is getting too anxious and tense and that affect his sleep , without sleep , he is getting manic      He is going to gym 2 times per week, enjoys time spending in the yard growing tomatoes in the summer, helping his daughters with grandchildren.     Usually his mood is stable, but when he has not many things to do, he is getting fidgety, constantly thinking about what he needs to do, thinking about other staff     The pt denied feeling hopeless/helpless, denied AVH/SI/HI now     No panic attacks       The following portions of the patient's history were reviewed and updated as appropriate: allergies, current medications, past family history, past medical history, past social history, past surgical history and problem list.    PAST PSYCHIATRIC HISTORY  Axis I  Affective/Bipolar Disorder, Anxiety/Panic Disorder  2002 [place], at that time he was wandering , walking a lot, police brought hin to the hospital , he was also experiencing AVH  Axis II  Defer     PAST OUTPATIENT TREATMENT  Diagnosis treated:  Affective Disorder,  Anxiety/Panic Disorder  Treatment Type:  Psychotherapy, Medication Management  Prior Psychiatric Medications:  abilify - side effects  lexapro - not effective   seroquel - sedation , weight gain     Support Groups:  None   Sequelae Of Mental Disorder:  emotional distress          Interval History  No changes      Side Effects  Denied       Past Medical History:  Past Medical History:   Diagnosis Date   • AAA (abdominal aortic aneurysm) ([facility]ly [facility]) 2019   • Cancer ([facility])    • Depression    • GERD (gastroesophageal reflux disease)    • H/O complete eye exam 10/2016   • Headache    • Headache, tension-type    • Headache, tension-type    • HL (hearing loss)    • Migraine    • Panic attack    • Sleep apnea    • TB (pulmonary tuberculosis)    • Ulnar nerve compression, left 10/6/2017       Social History:  Social History     Socioeconomic History   • Marital status:    Tobacco Use   • Smoking status: Former Smoker     Packs/day: 4.00     Years: 35.00     Pack years: 140.00     Start date: 1966     Quit date: 2002     Years since quittin.9   • Smokeless tobacco: Never Used   • Tobacco comment: didn't enjoy it   Vaping Use   • Vaping Use: Never used   Substance and Sexual Activity   • Alcohol use: No   • Drug use: No   • Sexual activity: Never     The pt lives with daughter   2 years in the  service in [facility]     Family History:  Family History   Problem Relation Age of Onset   • Heart disease Other    • Hypertension Other    • Hyperlipidemia Other    • Leukemia Other    • Depression Other    • COPD Other    • Diabetes Other    • Migraines Mother        Past Surgical History:  Past Surgical History:   Procedure Laterality Date   • APPENDECTOMY     • COLONOSCOPY     • SHOULDER ARTHROSCOPY      RIGHT ARM       Problem List:  Patient Active Problem List   Diagnosis   • Bipolar I disorder, moderate, current or most recent episode depressed, with anxious distress ([facility])   • Fatty liver   •  Nonintractable episodic headache   • Ulnar nerve compression, left   • CLL/SLL   • Pulmonary emboli (HCC)   • AAA (abdominal aortic aneurysm) (HCC)   • Generalized anxiety disorder   • Insomnia due to mental condition       Allergy:   No Known Allergies     Discontinued Medications:  Medications Discontinued During This Encounter   Medication Reason   • lithium (ESKALITH) 450 MG CR tablet Reorder   • risperiDONE (RisperDAL) 2 MG tablet Reorder   • LORazepam (ATIVAN) 0.5 MG tablet Reorder   • buPROPion XL (WELLBUTRIN XL) 150 MG 24 hr tablet Reorder       Current Medications:   Current Outpatient Medications   Medication Sig Dispense Refill   • buPROPion XL (WELLBUTRIN XL) 150 MG 24 hr tablet Take 1 tablet by mouth Every Morning. 90 tablet 1   • butalbital-acetaminophen-caffeine-codeine (FIORICET/CODEINE) -94-30 MG per capsule Take 1 capsule by mouth Every 4 (Four) Hours As Needed for Headache. 30 capsule 5   • diclofenac (VOLTAREN) 1 % gel gel Apply 4 g topically 4 (Four) Times a Day As Needed.     • ketoconazole (NIZORAL) 2 % shampoo Apply  topically 2 (Two) Times a Week.     • lithium (ESKALITH) 450 MG CR tablet Take 1 tablet by mouth 3 (Three) Times a Day. 270 tablet 1   • Loratadine 10 MG capsule Take  by mouth.     • LORazepam (ATIVAN) 0.5 MG tablet Take 3 tablets by mouth Every Night. 90 tablet 2   • Omega-3 Fatty Acids (FISH OIL) 1000 MG capsule capsule Take  by mouth 3 (Three) Times a Day With Meals.     • omeprazole (priLOSEC) 20 MG capsule Take 20 mg by mouth Daily.     • risperiDONE (RisperDAL) 2 MG tablet Take 1 tablet by mouth Every Night. 90 tablet 1   • rivaroxaban (XARELTO) 20 MG tablet Take 20 mg by mouth Daily.     • Testosterone Cypionate (DEPOTESTOTERONE CYPIONATE) 200 MG/ML injection      • triamcinolone (KENALOG) 0.1 % cream        No current facility-administered medications for this visit.         Psychological ROS: positive for - anxiety  negative for - depression, disorientation,  hallucinations, obsessive thoughts or suicidal ideation      Physical Exam:   There were no vitals taken for this visit.    Mental Status Exam:   Hygiene:   good  Cooperation:  Cooperative  Eye Contact:  Good  Psychomotor Behavior:  Appropriate  Affect:  Blunted  Mood: fluctates  Hopelessness: Denies  Speech:  Normal  Thought Process:  Goal directed  Thought Content:  Normal and Mood congruent  Suicidal:  None  Homicidal:  None  Hallucinations:  None  Delusion:  None  Memory:  Intact  Orientation:  Person, Place, Time and Situation  Reliability:  good  Insight:  Good  Judgement:  Good  Impulse Control:  Fair  Physical/Medical Issues:  Yes       MSE from 6/23/2021  reviewed and no changes necessary     PHQ-9 Depression Screening  Little interest or pleasure in doing things? 2   Feeling down, depressed, or hopeless? 2   Trouble falling or staying asleep, or sleeping too much? 1   Feeling tired or having little energy? 1   Poor appetite or overeating? 0   Feeling bad about yourself - or that you are a failure or have let yourself or your family down? 1   Trouble concentrating on things, such as reading the newspaper or watching television? 1   Moving or speaking so slowly that other people could have noticed? Or the opposite - being so fidgety or restless that you have been moving around a lot more than usual? 1   Thoughts that you would be better off dead, or of hurting yourself in some way? 0   PHQ-9 Total Score 9   If you checked off any problems, how difficult have these problems made it for you to do your work, take care of things at home, or get along with other people? Very difficult           Former smoker    I advised Sebas of the risks of tobacco use.     Lab Results:   No visits with results within 3 Month(s) from this visit.   Latest known visit with results is:   Lab on 05/04/2021   Component Date Value Ref Range Status   • Lithium 05/04/2021 0.8  0.6 - 1.2 mmol/L Final       Assessment/Plan   Problems  Addressed this Visit        Mental Health    Generalized anxiety disorder (Chronic)    Relevant Medications    buPROPion XL (WELLBUTRIN XL) 150 MG 24 hr tablet    lithium (ESKALITH) 450 MG CR tablet    risperiDONE (RisperDAL) 2 MG tablet    LORazepam (ATIVAN) 0.5 MG tablet    Insomnia due to mental condition (Chronic)    Relevant Medications    buPROPion XL (WELLBUTRIN XL) 150 MG 24 hr tablet    lithium (ESKALITH) 450 MG CR tablet    risperiDONE (RisperDAL) 2 MG tablet    LORazepam (ATIVAN) 0.5 MG tablet    Bipolar I disorder, moderate, current or most recent episode depressed, with anxious distress (HCC) - Primary    Relevant Medications    buPROPion XL (WELLBUTRIN XL) 150 MG 24 hr tablet    lithium (ESKALITH) 450 MG CR tablet    risperiDONE (RisperDAL) 2 MG tablet    LORazepam (ATIVAN) 0.5 MG tablet    Other Relevant Orders    Lithium Level    Comprehensive Metabolic Panel      Other Visit Diagnoses     Encounter for long-term (current) use of other medications        Relevant Orders    Lithium Level    Comprehensive Metabolic Panel      Diagnoses       Codes Comments    Bipolar I disorder, moderate, current or most recent episode depressed, with anxious distress (HCC)    -  Primary ICD-10-CM: F31.32  ICD-9-CM: 296.52     Generalized anxiety disorder     ICD-10-CM: F41.1  ICD-9-CM: 300.02     Insomnia due to mental condition     ICD-10-CM: F51.05  ICD-9-CM: 300.9, 327.02     Encounter for long-term (current) use of other medications     ICD-10-CM: Z79.899  ICD-9-CM: V58.69           Visit Diagnoses:    ICD-10-CM ICD-9-CM   1. Bipolar I disorder, moderate, current or most recent episode depressed, with anxious distress (HCC)  F31.32 296.52   2. Generalized anxiety disorder  F41.1 300.02   3. Insomnia due to mental condition  F51.05 300.9     327.02   4. Encounter for long-term (current) use of other medications  Z79.899 V58.69       TREATMENT PLAN/GOALS: Continue supportive psychotherapy efforts and medications as  indicated. Treatment and medication options discussed during today's visit. Patient ackowledged and verbally consented to continue with current treatment plan and was educated on the importance of compliance with treatment and follow-up appointments.    MEDICATION ISSUES:  1. Bipolar d/o depressed, moderate - cont L, Risperidone, cont wellbutirn at 150  mg , cont Li 450 mg TID - Li level therapeutic at 0.8 , will repeat     2. Generalied anxiety d/o  - cont lorazepam  , GDR was suggested, daughter expressed concerns about destabilization when he does not sleep well     3. Insomnia - sleep hygiene       The pt [place]     INSPECT reviewed as expected 12/14/2021  lorazepam  From PCP     PHQ scored 9 - mild depression     Labs 5/4/2021 Li level 0.8 , TSH WNL     The pt was stable on current meds, previous GDR attempts failed, the pt was gettng unstable   Long term benzo use discussed with the pt and his daughter , they verbalized understanding   And were willing to continue meds     Discussed medication options and treatment plan of prescribed medication as well as the risks, benefits, and side effects including potential falls, possible impaired driving and metabolic adversities among others. Patient is agreeable to call the office with any worsening of symptoms or onset of side effects. Patient is agreeable to call 911 or go to the nearest ER should he/she begin having SI/HI. No medication side effects or related complaints today.     MEDS ORDERED DURING VISIT:  New Medications Ordered This Visit   Medications   • buPROPion XL (WELLBUTRIN XL) 150 MG 24 hr tablet     Sig: Take 1 tablet by mouth Every Morning.     Dispense:  90 tablet     Refill:  1   • lithium (ESKALITH) 450 MG CR tablet     Sig: Take 1 tablet by mouth 3 (Three) Times a Day.     Dispense:  270 tablet     Refill:  1   • risperiDONE (RisperDAL) 2 MG tablet     Sig: Take 1 tablet by mouth Every Night.     Dispense:  90 tablet      Refill:  1   • LORazepam (ATIVAN) 0.5 MG tablet     Sig: Take 3 tablets by mouth Every Night.     Dispense:  90 tablet     Refill:  2     Please dispense on or after Abhijit 10, 2021       Return in about 3 months (around 3/23/2022).         This document has been electronically signed by Elizabeth Watkins MD  December 23, 2021 09:07 EST    EMR Dragon transcription disclaimer:  Some of this encounter note is an electronic transcription translation of spoken language to printed text. The electronic translation of spoken language may permit erroneous, or at times, nonsensical words or phrases to be inadvertently transcribed; Although I have reviewed the note for such errors some may still exist.

## 2021-12-23 NOTE — PATIENT INSTRUCTIONS
"[email or web address]   Generalized Anxiety Disorder, Adult  Generalized anxiety disorder (ANITA) is a mental health condition. Unlike normal worries, anxiety related to ANITA is not triggered by a specific event. These worries do not fade or get better with time. ANITA interferes with relationships, work, and school.  ANITA symptoms can vary from mild to severe. People with severe ANITA can have intense waves of anxiety with physical symptoms that are similar to panic attacks.  What are the causes?  The exact cause of ANITA is not known, but the following are believed to have an impact:  · Differences in natural brain chemicals.  · Genes passed down from parents to children.  · Differences in the way threats are perceived.  · Development during childhood.  · Personality.  What increases the risk?  The following factors may make you more likely to develop this condition:  · Being female.  · Having a family history of anxiety disorders.  · Being very shy.  · Experiencing very stressful life events, such as the death of a loved one.  · Having a very stressful family environment.  What are the signs or symptoms?  People with ANITA often worry excessively about many things in their lives, such as their health and family. Symptoms may also include:  · Mental and emotional symptoms:  ? Worrying excessively about natural disasters.  ? Fear of being late.  ? Difficulty concentrating.  ? Fears that others are judging your performance.  · Physical symptoms:  ? Fatigue.  ? Headaches, muscle tension, muscle twitches, trembling, or feeling shaky.  ? Feeling like your heart is pounding or beating very fast.  ? Feeling out of breath or like you cannot take a deep breath.  ? Having trouble falling asleep or staying asleep, or experiencing restlessness.  ? Sweating.  ? Nausea, diarrhea, or irritable bowel syndrome (IBS).  · Behavioral symptoms:  ? Experiencing erratic moods or irritability.  ? Avoidance of new situations.  ? Avoidance of " people.  ? Extreme difficulty making decisions.  How is this diagnosed?  This condition is diagnosed based on your symptoms and medical history. You will also have a physical exam. Your health care provider may perform tests to rule out other possible causes of your symptoms.  To be diagnosed with ANITA, a person must have anxiety that:  · Is out of his or her control.  · Affects several different aspects of his or her life, such as work and relationships.  · Causes distress that makes him or her unable to take part in normal activities.  · Includes at least three symptoms of ANITA, such as restlessness, fatigue, trouble concentrating, irritability, muscle tension, or sleep problems.  Before your health care provider can confirm a diagnosis of ANITA, these symptoms must be present more days than they are not, and they must last for 6 months or longer.  How is this treated?  This condition may be treated with:  · Medicine. Antidepressant medicine is usually prescribed for long-term daily control. Anti-anxiety medicines may be added in severe cases, especially when panic attacks occur.  · Talk therapy (psychotherapy). Certain types of talk therapy can be helpful in treating ANITA by providing support, education, and guidance. Options include:  ? Cognitive behavioral therapy (CBT). People learn coping skills and self-calming techniques to ease their physical symptoms. They learn to identify unrealistic thoughts and behaviors and to replace them with more appropriate thoughts and behaviors.  ? Acceptance and commitment therapy (ACT). This treatment teaches people how to be mindful as a way to cope with unwanted thoughts and feelings.  ? Biofeedback. This process trains you to manage your body's response (physiological response) through breathing techniques and relaxation methods. You will work with a therapist while machines are used to monitor your physical symptoms.  · Stress management techniques. These include yoga,  meditation, and exercise.  A mental health specialist can help determine which treatment is best for you. Some people see improvement with one type of therapy. However, other people require a combination of therapies.  Follow these instructions at home:  Lifestyle  · Maintain a consistent routine and schedule.  · Anticipate stressful situations. Create a plan, and allow extra time to work with your plan.  · Practice stress management or self-calming techniques that you have learned from your therapist or your health care provider.  General instructions  · Take over-the-counter and prescription medicines only as told by your health care provider.  · Understand that you are likely to have setbacks. Accept this and be kind to yourself as you persist to take better care of yourself.  · Recognize and accept your accomplishments, even if you  them as small.  · Keep all follow-up visits as told by your health care provider. This is important.  Contact a health care provider if:  · Your symptoms do not get better.  · Your symptoms get worse.  · You have signs of depression, such as:  ? A persistently sad or irritable mood.  ? Loss of enjoyment in activities that used to bring you ramy.  ? Change in weight or eating.  ? Changes in sleeping habits.  ? Avoiding friends or family members.  ? Loss of energy for normal tasks.  ? Feelings of guilt or worthlessness.  Get help right away if:  · You have serious thoughts about hurting yourself or others.  If you ever feel like you may hurt yourself or others, or have thoughts about taking your own life, get help right away. Go to your nearest emergency department or:  · Call your local emergency services (411 in the [place]  · Call a suicide crisis helpline, such as the National Suicide Prevention Lifeline at 1-226.533.7208. This is open 24 hours a day in the [place]  · Text the Crisis Text Line at 229948 (in the [place]  Summary  · Generalized anxiety disorder (ANITA) is a mental  health condition that involves worry that is not triggered by a specific event.  · People with ANITA often worry excessively about many things in their lives, such as their health and family.  · ANITA may cause symptoms such as restlessness, trouble concentrating, sleep problems, frequent sweating, nausea, diarrhea, headaches, and trembling or muscle twitching.  · A mental health specialist can help determine which treatment is best for you. Some people see improvement with one type of therapy. However, other people require a combination of therapies.  This information is not intended to replace advice given to you by your health care provider. Make sure you discuss any questions you have with your health care provider.  Document Revised: 10/07/2020 Document Reviewed: 10/07/2020  [employer] Patient Education © 2021 [employer]

## 2022-01-03 ENCOUNTER — LAB (OUTPATIENT)
Dept: LAB | Facility: HOSPITAL | Age: 72
End: 2022-01-03

## 2022-01-03 DIAGNOSIS — Z79.899 ENCOUNTER FOR LONG-TERM (CURRENT) USE OF OTHER MEDICATIONS: ICD-10-CM

## 2022-01-03 DIAGNOSIS — F31.32 BIPOLAR I DISORDER, MODERATE, CURRENT OR MOST RECENT EPISODE DEPRESSED, WITH ANXIOUS DISTRESS (HCC): ICD-10-CM

## 2022-01-03 LAB
ALBUMIN SERPL-MCNC: 4.5 G/DL (ref 3.5–5.2)
ALBUMIN/GLOB SERPL: 2.3 G/DL
ALP SERPL-CCNC: 46 U/L (ref 39–117)
ALT SERPL W P-5'-P-CCNC: 12 U/L (ref 1–41)
ANION GAP SERPL CALCULATED.3IONS-SCNC: 4.1 MMOL/L (ref 5–15)
AST SERPL-CCNC: 16 U/L (ref 1–40)
BILIRUB SERPL-MCNC: 0.5 MG/DL (ref 0–1.2)
BUN SERPL-MCNC: 11 MG/DL (ref 8–23)
BUN/CREAT SERPL: 9.6 (ref 7–25)
CALCIUM SPEC-SCNC: 9.8 MG/DL (ref 8.6–10.5)
CHLORIDE SERPL-SCNC: 104 MMOL/L (ref 98–107)
CO2 SERPL-SCNC: 28.9 MMOL/L (ref 22–29)
CREAT SERPL-MCNC: 1.14 MG/DL (ref 0.76–1.27)
GFR SERPL CREATININE-BSD FRML MDRD: 63 ML/MIN/1.73
GLOBULIN UR ELPH-MCNC: 2 GM/DL
GLUCOSE SERPL-MCNC: 106 MG/DL (ref 65–99)
LITHIUM SERPL-SCNC: 0.7 MMOL/L (ref 0.6–1.2)
POTASSIUM SERPL-SCNC: 4 MMOL/L (ref 3.5–5.2)
PROT SERPL-MCNC: 6.5 G/DL (ref 6–8.5)
SODIUM SERPL-SCNC: 137 MMOL/L (ref 136–145)

## 2022-01-03 PROCEDURE — 36415 COLL VENOUS BLD VENIPUNCTURE: CPT

## 2022-01-03 PROCEDURE — 80053 COMPREHEN METABOLIC PANEL: CPT

## 2022-01-03 PROCEDURE — 80178 ASSAY OF LITHIUM: CPT

## 2022-03-23 ENCOUNTER — OFFICE VISIT (OUTPATIENT)
Dept: PSYCHIATRY | Facility: CLINIC | Age: 72
End: 2022-03-23

## 2022-03-23 VITALS — BODY MASS INDEX: 23.37 KG/M2 | WEIGHT: 187 LBS

## 2022-03-23 DIAGNOSIS — F31.32 BIPOLAR I DISORDER, MODERATE, CURRENT OR MOST RECENT EPISODE DEPRESSED, WITH ANXIOUS DISTRESS (HCC): Primary | Chronic | ICD-10-CM

## 2022-03-23 DIAGNOSIS — F41.1 GENERALIZED ANXIETY DISORDER: Chronic | ICD-10-CM

## 2022-03-23 DIAGNOSIS — F51.05 INSOMNIA DUE TO MENTAL CONDITION: Chronic | ICD-10-CM

## 2022-03-23 PROCEDURE — 99214 OFFICE O/P EST MOD 30 MIN: CPT | Performed by: PSYCHIATRY & NEUROLOGY

## 2022-03-23 RX ORDER — LORAZEPAM 0.5 MG/1
1.5 TABLET ORAL NIGHTLY
Qty: 90 TABLET | Refills: 2 | Status: SHIPPED | OUTPATIENT
Start: 2022-03-23 | End: 2022-07-22 | Stop reason: SDUPTHER

## 2022-03-23 RX ORDER — LITHIUM CARBONATE 450 MG
450 TABLET, EXTENDED RELEASE ORAL 3 TIMES DAILY
Qty: 270 TABLET | Refills: 1 | Status: SHIPPED | OUTPATIENT
Start: 2022-03-23 | End: 2022-07-22 | Stop reason: SDUPTHER

## 2022-03-23 RX ORDER — BUPROPION HYDROCHLORIDE 300 MG/1
300 TABLET ORAL EVERY MORNING
Qty: 90 TABLET | Refills: 1 | Status: SHIPPED | OUTPATIENT
Start: 2022-03-23 | End: 2022-07-22

## 2022-03-23 RX ORDER — RISPERIDONE 2 MG/1
2 TABLET ORAL NIGHTLY
Qty: 90 TABLET | Refills: 1 | Status: SHIPPED | OUTPATIENT
Start: 2022-03-23 | End: 2022-07-22 | Stop reason: SDUPTHER

## 2022-03-23 NOTE — PROGRESS NOTES
Subjective   Sebas Rouse is a 71 y.o. male who presents today for follow up    Chief Complaint: increased depression anxiety, poor motivations memory issues     History of Present Illness: the pt suffered from bipolar  depression for 20 years, was stable on wellbutrin and lorazepam for many years.  [facility] not wiling to [facility]ep[facility] anymore   He was stable on Li and Wellbutrin , lorazepam     He is on Li 900 QAM and 450 mg QHS , Li level was checked in Jan 2022  and it was 0.7 , stated he was always in therapeutic range for al those years     Today the pt c/o decreased E, poor motivations, sleep fragmented   he likes to stay at home, but not much to do and he feels lonely,   during fall ans summer - he likes to spend time in his yard  .   Memory short term decreased , forgetful, misplacing items    The pt is on lorazepam before bed because he is getting too anxious and tense and that affect his sleep , without sleep , he is getting manic        The pt denied feeling hopeless/helpless, denied AVH/SI/HI now     No panic attacks       The following portions of the patient's history were reviewed and updated as appropriate: allergies, current medications, past family history, past medical history, past social history, past surgical history and problem list.    PAST PSYCHIATRIC HISTORY  Axis I  Affective/Bipolar Disorder, Anxiety/Panic Disorder  2002 [place], at that time he was wandering , walking a lot, police brought hin to the hospital , he was also experiencing AVH  Axis II  Defer     PAST OUTPATIENT TREATMENT  Diagnosis treated:  Affective Disorder, Anxiety/Panic Disorder  Treatment Type:  Psychotherapy, Medication Management  Prior Psychiatric Medications:  abilify - side effects  lexapro - not effective   seroquel - sedation , weight gain     Support Groups:  None   Sequelae Of Mental Disorder:  emotional distress          Interval History  Depression worse       Side  Effects  Tremor       Past Medical History:  Past Medical History:   Diagnosis Date   • AAA (abdominal aortic aneurysm) ([facility]) 2019   • Cancer ([facility])    • Depression    • GERD (gastroesophageal reflux disease)    • H/O complete eye exam 10/2016   • Headache    • Headache, tension-type    • Headache, tension-type    • HL (hearing loss)    • Migraine    • Panic attack    • Sleep apnea    • TB (pulmonary tuberculosis)    • Ulnar nerve compression, left 10/6/2017       Social History:  Social History     Socioeconomic History   • Marital status:    Tobacco Use   • Smoking status: Former Smoker     Packs/day: 4.00     Years: 35.00     Pack years: 140.00     Start date: 1966     Quit date: 2002     Years since quittin.2   • Smokeless tobacco: Never Used   • Tobacco comment: didn't enjoy it   Vaping Use   • Vaping Use: Never used   Substance and Sexual Activity   • Alcohol use: No   • Drug use: No   • Sexual activity: Never     The pt lives with daughter   2 years in the  service in [employer]     Family History:  Family History   Problem Relation Age of Onset   • Heart disease Other    • Hypertension Other    • Hyperlipidemia Other    • Leukemia Other    • Depression Other    • COPD Other    • Diabetes Other    • Migraines Mother        Past Surgical History:  Past Surgical History:   Procedure Laterality Date   • APPENDECTOMY     • COLONOSCOPY     • SHOULDER ARTHROSCOPY      RIGHT ARM       Problem List:  Patient Active Problem List   Diagnosis   • Bipolar I disorder, moderate, current or most recent episode depressed, with anxious distress ([facility])   • Fatty liver   • Nonintractable episodic headache   • Ulnar nerve compression, left   • CLL/SLL   • Pulmonary emboli ([facility])   • AAA (abdominal aortic aneurysm) ([facility])   • Generalized anxiety disorder   • Insomnia due to mental condition       Allergy:   No Known Allergies     Discontinued Medications:  Medications Discontinued During This  Encounter   Medication Reason   • buPROPion XL (WELLBUTRIN XL) 150 MG 24 hr tablet    • lithium (ESKALITH) 450 MG CR tablet Reorder   • risperiDONE (RisperDAL) 2 MG tablet Reorder   • LORazepam (ATIVAN) 0.5 MG tablet Reorder       Current Medications:   Current Outpatient Medications   Medication Sig Dispense Refill   • buPROPion XL (WELLBUTRIN XL) 300 MG 24 hr tablet Take 1 tablet by mouth Every Morning. 90 tablet 1   • lithium (ESKALITH) 450 MG CR tablet Take 1 tablet by mouth 3 (Three) Times a Day. 270 tablet 1   • LORazepam (ATIVAN) 0.5 MG tablet Take 3 tablets by mouth Every Night. 90 tablet 2   • risperiDONE (RisperDAL) 2 MG tablet Take 1 tablet by mouth Every Night. 90 tablet 1   • butalbital-acetaminophen-caffeine-codeine (FIORICET/CODEINE) -23-30 MG per capsule Take 1 capsule by mouth Every 4 (Four) Hours As Needed for Headache. 30 capsule 5   • diclofenac (VOLTAREN) 1 % gel gel Apply 4 g topically 4 (Four) Times a Day As Needed.     • ketoconazole (NIZORAL) 2 % shampoo Apply  topically 2 (Two) Times a Week.     • Loratadine 10 MG capsule Take  by mouth.     • Omega-3 Fatty Acids (FISH OIL) 1000 MG capsule capsule Take  by mouth 3 (Three) Times a Day With Meals.     • omeprazole (priLOSEC) 20 MG capsule Take 20 mg by mouth Daily.     • rivaroxaban (XARELTO) 20 MG tablet Take 20 mg by mouth Daily.     • Testosterone Cypionate (DEPOTESTOTERONE CYPIONATE) 200 MG/ML injection      • triamcinolone (KENALOG) 0.1 % cream        No current facility-administered medications for this visit.         Psychological ROS: positive for - anxiety  negative for - depression, disorientation, hallucinations, obsessive thoughts or suicidal ideation      Physical Exam:   Weight 84.8 kg (187 lb).    Mental Status Exam:   Hygiene:   good  Cooperation:  Cooperative  Eye Contact:  Good  Psychomotor Behavior:  hand tremor   Affect:  Blunted  Mood: fluctates  Hopelessness: Denies  Speech:  Normal  Thought Process:  Goal  directed  Thought Content:  Normal and Mood congruent  Suicidal:  None  Homicidal:  None  Hallucinations:  None  Delusion:  None  Memory:  Intact  Orientation:  Person, Place, Time and Situation  Reliability:  good  Insight:  Good  Judgement:  Good  Impulse Control:  Fair  Physical/Medical Issues:  Yes       MSE from 12/23/2021  reviewed and no changes necessary     PHQ-9 Depression Screening  Little interest or pleasure in doing things? 2-->more than half the days   Feeling down, depressed, or hopeless? 2-->more than half the days   Trouble falling or staying asleep, or sleeping too much? 2-->more than half the days   Feeling tired or having little energy? 3-->nearly every day   Poor appetite or overeating? 0-->not at all   Feeling bad about yourself - or that you are a failure or have let yourself or your family down? 2-->more than half the days   Trouble concentrating on things, such as reading the newspaper or watching television? 2-->more than half the days   Moving or speaking so slowly that other people could have noticed? Or the opposite - being so fidgety or restless that you have been moving around a lot more than usual? 1-->several days   Thoughts that you would be better off dead, or of hurting yourself in some way? 0-->not at all   PHQ-9 Total Score 14   If you checked off any problems, how difficult have these problems made it for you to do your work, take care of things at home, or get along with other people? very difficult         Former smoker    I advised Sebas of the risks of tobacco use.     Lab Results:   Lab on 01/03/2022   Component Date Value Ref Range Status   • Glucose 01/03/2022 106 (A) 65 - 99 mg/dL Final   • BUN 01/03/2022 11  8 - 23 mg/dL Final   • Creatinine 01/03/2022 1.14  0.76 - 1.27 mg/dL Final   • Sodium 01/03/2022 137  136 - 145 mmol/L Final   • Potassium 01/03/2022 4.0  3.5 - 5.2 mmol/L Final   • Chloride 01/03/2022 104  98 - 107 mmol/L Final   • CO2 01/03/2022 28.9  22.0 -  29.0 mmol/L Final   • Calcium 01/03/2022 9.8  8.6 - 10.5 mg/dL Final   • Total Protein 01/03/2022 6.5  6.0 - 8.5 g/dL Final   • Albumin 01/03/2022 4.50  3.50 - 5.20 g/dL Final   • ALT (SGPT) 01/03/2022 12  1 - 41 U/L Final   • AST (SGOT) 01/03/2022 16  1 - 40 U/L Final   • Alkaline Phosphatase 01/03/2022 46  39 - 117 U/L Final   • Total Bilirubin 01/03/2022 0.5  0.0 - 1.2 mg/dL Final   • eGFR Non  Amer 01/03/2022 63  >60 mL/min/1.73 Final   • Globulin 01/03/2022 2.0  gm/dL Final   • A/G Ratio 01/03/2022 2.3  g/dL Final   • BUN/Creatinine Ratio 01/03/2022 9.6  7.0 - 25.0 Final   • Anion Gap 01/03/2022 4.1 (A) 5.0 - 15.0 mmol/L Final   • Lithium 01/03/2022 0.7  0.6 - 1.2 mmol/L Final       Assessment/Plan   Problems Addressed this Visit        Mental Health    Generalized anxiety disorder (Chronic)    Relevant Medications    buPROPion XL (WELLBUTRIN XL) 300 MG 24 hr tablet    lithium (ESKALITH) 450 MG CR tablet    risperiDONE (RisperDAL) 2 MG tablet    LORazepam (ATIVAN) 0.5 MG tablet    Insomnia due to mental condition (Chronic)    Relevant Medications    buPROPion XL (WELLBUTRIN XL) 300 MG 24 hr tablet    lithium (ESKALITH) 450 MG CR tablet    risperiDONE (RisperDAL) 2 MG tablet    LORazepam (ATIVAN) 0.5 MG tablet    Bipolar I disorder, moderate, current or most recent episode depressed, with anxious distress (HCC) - Primary    Relevant Medications    buPROPion XL (WELLBUTRIN XL) 300 MG 24 hr tablet    lithium (ESKALITH) 450 MG CR tablet    risperiDONE (RisperDAL) 2 MG tablet    LORazepam (ATIVAN) 0.5 MG tablet      Diagnoses       Codes Comments    Bipolar I disorder, moderate, current or most recent episode depressed, with anxious distress (HCC)    -  Primary ICD-10-CM: F31.32  ICD-9-CM: 296.52     Generalized anxiety disorder     ICD-10-CM: F41.1  ICD-9-CM: 300.02     Insomnia due to mental condition     ICD-10-CM: F51.05  ICD-9-CM: 300.9, 327.02           Visit Diagnoses:    ICD-10-CM ICD-9-CM   1.  Bipolar I disorder, moderate, current or most recent episode depressed, with anxious distress (HCC)  F31.32 296.52   2. Generalized anxiety disorder  F41.1 300.02   3. Insomnia due to mental condition  F51.05 300.9     327.02       TREATMENT PLAN/GOALS: Continue supportive psychotherapy efforts and medications as indicated. Treatment and medication options discussed during today's visit. Patient ackowledged and verbally consented to continue with current treatment plan and was educated on the importance of compliance with treatment and follow-up appointments.    MEDICATION ISSUES:  1. Bipolar d/o depressed, moderate - cont L, Risperidone, cont wellbutirn at 150  mg , cont Li 450 mg TID -     Li level therapeutic at 0.7 , 1/3/2022  CMP 1/3/22 glucose 106     2. Generalied anxiety d/o  - cont lorazepam  , GDR was suggested, daughter expressed concerns about destabilization when he does not sleep well     3. Insomnia - sleep hygiene       The pt [place] [facility] [place] [facility] [facility] [place] [facility] [place]     INSPECT reviewed as expected 2/16/22   lorazepam       PHQ scored 14 - moderate depression (due to decreased E)   ANITA 7 scored 3          The pt was stable on current meds, previous GDR attempts failed, the pt was gettng unstable   Long term benzo use discussed with the pt and his daughter , they verbalized understanding   And were willing to continue meds     Discussed medication options and treatment plan of prescribed medication as well as the risks, benefits, and side effects including potential falls, possible impaired driving and metabolic adversities among others. Patient is agreeable to call the office with any worsening of symptoms or onset of side effects. Patient is agreeable to call 911 or go to the nearest ER should he/she begin having SI/HI. No medication side effects or related complaints today.     MEDS ORDERED DURING VISIT:  New Medications Ordered This Visit   Medications   • buPROPion XL (WELLBUTRIN XL) 300 MG 24 hr  tablet     Sig: Take 1 tablet by mouth Every Morning.     Dispense:  90 tablet     Refill:  1   • lithium (ESKALITH) 450 MG CR tablet     Sig: Take 1 tablet by mouth 3 (Three) Times a Day.     Dispense:  270 tablet     Refill:  1   • risperiDONE (RisperDAL) 2 MG tablet     Sig: Take 1 tablet by mouth Every Night.     Dispense:  90 tablet     Refill:  1   • LORazepam (ATIVAN) 0.5 MG tablet     Sig: Take 3 tablets by mouth Every Night.     Dispense:  90 tablet     Refill:  2       Return in about 4 months (around 7/23/2022).         This document has been electronically signed by Elizabeth Watkins MD  March 23, 2022 09:05 EDT    EMR Dragon transcription disclaimer:  Some of this encounter note is an electronic transcription translation of spoken language to printed text. The electronic translation of spoken language may permit erroneous, or at times, nonsensical words or phrases to be inadvertently transcribed; Although I have reviewed the note for such errors some may still exist.

## 2022-07-22 ENCOUNTER — OFFICE VISIT (OUTPATIENT)
Dept: PSYCHIATRY | Facility: CLINIC | Age: 72
End: 2022-07-22

## 2022-07-22 DIAGNOSIS — F41.1 GENERALIZED ANXIETY DISORDER: Chronic | ICD-10-CM

## 2022-07-22 DIAGNOSIS — R25.1 TREMOR: ICD-10-CM

## 2022-07-22 DIAGNOSIS — F51.05 INSOMNIA DUE TO MENTAL CONDITION: Chronic | ICD-10-CM

## 2022-07-22 DIAGNOSIS — F31.32 BIPOLAR I DISORDER, MODERATE, CURRENT OR MOST RECENT EPISODE DEPRESSED, WITH ANXIOUS DISTRESS (HCC): Primary | ICD-10-CM

## 2022-07-22 PROCEDURE — 99214 OFFICE O/P EST MOD 30 MIN: CPT | Performed by: PSYCHIATRY & NEUROLOGY

## 2022-07-22 RX ORDER — LITHIUM CARBONATE 450 MG
450 TABLET, EXTENDED RELEASE ORAL 3 TIMES DAILY
Qty: 270 TABLET | Refills: 1 | Status: SHIPPED | OUTPATIENT
Start: 2022-07-22

## 2022-07-22 RX ORDER — RISPERIDONE 2 MG/1
2 TABLET ORAL NIGHTLY
Qty: 90 TABLET | Refills: 1 | Status: SHIPPED | OUTPATIENT
Start: 2022-07-22

## 2022-07-22 RX ORDER — BUPROPION HYDROCHLORIDE 300 MG/1
300 TABLET ORAL EVERY MORNING
Qty: 90 TABLET | Refills: 1 | Status: SHIPPED | OUTPATIENT
Start: 2022-07-22

## 2022-07-22 RX ORDER — LORAZEPAM 0.5 MG/1
1.5 TABLET ORAL NIGHTLY
Qty: 90 TABLET | Refills: 2 | Status: SHIPPED | OUTPATIENT
Start: 2022-07-22

## 2022-07-22 RX ORDER — BENZTROPINE MESYLATE 1 MG/1
1 TABLET ORAL 2 TIMES DAILY
Qty: 60 TABLET | Refills: 3 | Status: SHIPPED | OUTPATIENT
Start: 2022-07-22

## 2022-07-22 RX ORDER — BUPROPION HYDROCHLORIDE 150 MG/1
150 TABLET ORAL EVERY MORNING
Qty: 90 TABLET | Refills: 2 | Status: SHIPPED | OUTPATIENT
Start: 2022-07-22 | End: 2023-07-22

## 2022-07-22 NOTE — PROGRESS NOTES
Subjective   Sebas Rouse is a 72 y.o. male who presents today for follow up  And his daughter was on facetime during appt     Chief Complaint:   depression anxiety, poor motivations memory issues, tremor      History of Present Illness: the pt suffered from bipolar  depression for 20 years, was stable on wellbutrin and lorazepam for many years.  [facility] not wiling to prescribe [facility]ep[facility] anymore   He was stable on Li and Wellbutrin , lorazepam     He is on Li 900 QAM and 450 mg QHS , Li level was checked in Jan 2022  and it was 0.7 , stated he was always in therapeutic range for al those years     Today the pt c/o decreased E, tremor, Dr Saldivar started him on Ingrezza - tremor was worse , then  Austedo - no samples and pharmacy did not approve      The pt feels bored, hard to keep himself busy, he had no garden this time     poor motivations, sleep fragmented   he likes to stay at home, but not much to do and he feels lonely,   during fall ans summer - he likes to spend time in his yard  .   Memory short term decreased , forgetful, misplacing items      The pt is on lorazepam before bed because he is getting too anxious and tense and that affect his sleep , without sleep , he is getting manic        The pt denied feeling hopeless/helpless, denied AVH/SI/HI now     No panic attacks       The following portions of the patient's history were reviewed and updated as appropriate: allergies, current medications, past family history, past medical history, past social history, past surgical history and problem list.    PAST PSYCHIATRIC HISTORY  Axis I  Affective/Bipolar Disorder, Anxiety/Panic Disorder  2002 [place], at that time he was wandering , walking a lot, police brought hin to the hospital , he was also experiencing AVH  Axis II  Defer     PAST OUTPATIENT TREATMENT  Diagnosis treated:  Affective Disorder, Anxiety/Panic Disorder  Treatment Type:  Psychotherapy, Medication  Management  Prior Psychiatric Medications:  abilify - side effects  lexapro - not effective   seroquel - sedation , weight gain     Support Groups:  None   Sequelae Of Mental Disorder:  emotional distress          Interval History  More depressed now        Side Effects  Tremor       Past Medical History:  Past Medical History:   Diagnosis Date   • AAA (abdominal aortic aneurysm) ([facility]) 2019   • Cancer ([facility])    • Depression    • GERD (gastroesophageal reflux disease)    • H/O complete eye exam 10/2016   • Headache    • Headache, tension-type    • Headache, tension-type    • HL (hearing loss)    • Migraine    • Panic attack    • Sleep apnea    • TB (pulmonary tuberculosis)    • Ulnar nerve compression, left 10/6/2017       Social History:  Social History     Socioeconomic History   • Marital status:    Tobacco Use   • Smoking status: Former Smoker     Packs/day: 4.00     Years: 35.00     Pack years: 140.00     Start date: 1966     Quit date: 2002     Years since quittin.5   • Smokeless tobacco: Never Used   • Tobacco comment: didn't enjoy it   Vaping Use   • Vaping Use: Never used   Substance and Sexual Activity   • Alcohol use: No   • Drug use: No   • Sexual activity: Never     The pt lives with daughter   2 years in the  service in [facility]     Family History:  Family History   Problem Relation Age of Onset   • Heart disease Other    • Hypertension Other    • Hyperlipidemia Other    • Leukemia Other    • Depression Other    • COPD Other    • Diabetes Other    • Migraines Mother        Past Surgical History:  Past Surgical History:   Procedure Laterality Date   • APPENDECTOMY     • COLONOSCOPY     • SHOULDER ARTHROSCOPY      RIGHT ARM       Problem List:  Patient Active Problem List   Diagnosis   • Bipolar I disorder, moderate, current or most recent episode depressed, with anxious distress ([facility])   • Fatty liver   • Nonintractable episodic headache   • Ulnar nerve compression,  left   • CLL/SLL   • Pulmonary emboli ([facility])   • AAA (abdominal aortic aneurysm) ([facility])   • Generalized anxiety disorder   • Insomnia due to mental condition       Allergy:   No Known Allergies     Discontinued Medications:  Medications Discontinued During This Encounter   Medication Reason   • buPROPion XL (WELLBUTRIN XL) 300 MG 24 hr tablet    • lithium (ESKALITH) 450 MG CR tablet Reorder   • risperiDONE (RisperDAL) 2 MG tablet Reorder   • LORazepam (ATIVAN) 0.5 MG tablet Reorder       Current Medications:   Current Outpatient Medications   Medication Sig Dispense Refill   • buPROPion XL (WELLBUTRIN XL) 300 MG 24 hr tablet Take 1 tablet by mouth Every Morning. 90 tablet 1   • lithium (ESKALITH) 450 MG CR tablet Take 1 tablet by mouth 3 (Three) Times a Day. 270 tablet 1   • LORazepam (ATIVAN) 0.5 MG tablet Take 3 tablets by mouth Every Night. 90 tablet 2   • risperiDONE (RisperDAL) 2 MG tablet Take 1 tablet by mouth Every Night. 90 tablet 1   • benztropine (COGENTIN) 1 MG tablet Take 1 tablet by mouth 2 (Two) Times a Day. 60 tablet 3   • buPROPion XL (Wellbutrin XL) 150 MG 24 hr tablet Take 1 tablet by mouth Every Morning. 90 tablet 2   • butalbital-acetaminophen-caffeine-codeine (FIORICET/CODEINE) -00-30 MG per capsule Take 1 capsule by mouth Every 4 (Four) Hours As Needed for Headache. 30 capsule 5   • diclofenac (VOLTAREN) 1 % gel gel Apply 4 g topically 4 (Four) Times a Day As Needed.     • ketoconazole (NIZORAL) 2 % shampoo Apply  topically 2 (Two) Times a Week.     • Loratadine 10 MG capsule Take  by mouth.     • Omega-3 Fatty Acids (FISH OIL) 1000 MG capsule capsule Take  by mouth 3 (Three) Times a Day With Meals.     • omeprazole (priLOSEC) 20 MG capsule Take 20 mg by mouth Daily.     • rivaroxaban (XARELTO) 20 MG tablet Take 20 mg by mouth Daily.     • Testosterone Cypionate (DEPOTESTOTERONE CYPIONATE) 200 MG/ML injection      • triamcinolone (KENALOG) 0.1 % cream        No current  facility-administered medications for this visit.         Psychological ROS: positive for - anxiety, depression   negative for - depression, disorientation, hallucinations, obsessive thoughts or suicidal ideation      Physical Exam:   There were no vitals taken for this visit.    Mental Status Exam:   Hygiene:   good  Cooperation:  Cooperative  Eye Contact:  Good  Psychomotor Behavior:  hand tremor, slow movements    Affect:  Blunted  Mood: fluctates  Hopelessness: Denies  Speech:  Normal  Thought Process:  Goal directed  Thought Content:  Normal and Mood congruent  Suicidal:  None  Homicidal:  None  Hallucinations:  None  Delusion:  None  Memory:  Intact  Orientation:  Person, Place, Time and Situation  Reliability:  good  Insight:  Good  Judgement:  Good  Impulse Control:  Fair  Physical/Medical Issues:  Yes       MSE from 3/23/2022   reviewed and accepted with  changes       PHQ-9 Depression Screening  Little interest or pleasure in doing things? 2-->more than half the days   Feeling down, depressed, or hopeless? 2-->more than half the days   Trouble falling or staying asleep, or sleeping too much? 1-->several days   Feeling tired or having little energy? 1-->several days   Poor appetite or overeating? 0-->not at all   Feeling bad about yourself - or that you are a failure or have let yourself or your family down? 1-->several days   Trouble concentrating on things, such as reading the newspaper or watching television? 1-->several days   Moving or speaking so slowly that other people could have noticed? Or the opposite - being so fidgety or restless that you have been moving around a lot more than usual? 2-->more than half the days   Thoughts that you would be better off dead, or of hurting yourself in some way? 0-->not at all   PHQ-9 Total Score 10   If you checked off any problems, how difficult have these problems made it for you to do your work, take care of things at home, or get along with other people? very  difficult         Former smoker    I advised Sebas of the risks of tobacco use.     Lab Results:   No visits with results within 3 Month(s) from this visit.   Latest known visit with results is:   Lab on 01/03/2022   Component Date Value Ref Range Status   • Glucose 01/03/2022 106 (A) 65 - 99 mg/dL Final   • BUN 01/03/2022 11  8 - 23 mg/dL Final   • Creatinine 01/03/2022 1.14  0.76 - 1.27 mg/dL Final   • Sodium 01/03/2022 137  136 - 145 mmol/L Final   • Potassium 01/03/2022 4.0  3.5 - 5.2 mmol/L Final   • Chloride 01/03/2022 104  98 - 107 mmol/L Final   • CO2 01/03/2022 28.9  22.0 - 29.0 mmol/L Final   • Calcium 01/03/2022 9.8  8.6 - 10.5 mg/dL Final   • Total Protein 01/03/2022 6.5  6.0 - 8.5 g/dL Final   • Albumin 01/03/2022 4.50  3.50 - 5.20 g/dL Final   • ALT (SGPT) 01/03/2022 12  1 - 41 U/L Final   • AST (SGOT) 01/03/2022 16  1 - 40 U/L Final   • Alkaline Phosphatase 01/03/2022 46  39 - 117 U/L Final   • Total Bilirubin 01/03/2022 0.5  0.0 - 1.2 mg/dL Final   • eGFR Non  Amer 01/03/2022 63  >60 mL/min/1.73 Final   • Globulin 01/03/2022 2.0  gm/dL Final   • A/G Ratio 01/03/2022 2.3  g/dL Final   • BUN/Creatinine Ratio 01/03/2022 9.6  7.0 - 25.0 Final   • Anion Gap 01/03/2022 4.1 (A) 5.0 - 15.0 mmol/L Final   • Lithium 01/03/2022 0.7  0.6 - 1.2 mmol/L Final       Assessment & Plan   Problems Addressed this Visit        Mental Health    Generalized anxiety disorder (Chronic)    Relevant Medications    buPROPion XL (WELLBUTRIN XL) 300 MG 24 hr tablet    lithium (ESKALITH) 450 MG CR tablet    risperiDONE (RisperDAL) 2 MG tablet    buPROPion XL (Wellbutrin XL) 150 MG 24 hr tablet    LORazepam (ATIVAN) 0.5 MG tablet    Insomnia due to mental condition (Chronic)    Relevant Medications    buPROPion XL (WELLBUTRIN XL) 300 MG 24 hr tablet    lithium (ESKALITH) 450 MG CR tablet    risperiDONE (RisperDAL) 2 MG tablet    buPROPion XL (Wellbutrin XL) 150 MG 24 hr tablet    LORazepam (ATIVAN) 0.5 MG tablet    Bipolar I  disorder, moderate, current or most recent episode depressed, with anxious distress ([facility]CC) - Primary    Relevant Medications    buPROPion XL (WELLBUTRIN XL) 300 MG 24 hr tablet    lithium (ESKALITH) 450 MG CR tablet    risperiDONE (RisperDAL) 2 MG tablet    buPROPion XL (Wellbutrin XL) 150 MG 24 hr tablet    LORazepam (ATIVAN) 0.5 MG tablet      Other Visit Diagnoses     Tremor        Relevant Orders    Ambulatory Referral to Neurology      Diagnoses       Codes Comments    Bipolar I disorder, moderate, current or most recent episode depressed, with anxious distress (HCC)    -  Primary ICD-10-CM: F31.32  ICD-9-CM: 296.52     Generalized anxiety disorder     ICD-10-CM: F41.1  ICD-9-CM: 300.02     Insomnia due to mental condition     ICD-10-CM: F51.05  ICD-9-CM: 300.9, 327.02     Tremor     ICD-10-CM: R25.1  ICD-9-CM: 781.0           Visit Diagnoses:    ICD-10-CM ICD-9-CM   1. Bipolar I disorder, moderate, current or most recent episode depressed, with anxious distress (HCC)  F31.32 296.52   2. Generalized anxiety disorder  F41.1 300.02   3. Insomnia due to mental condition  F51.05 300.9     327.02   4. Tremor  R25.1 781.0       TREATMENT PLAN/GOALS: Continue supportive psychotherapy efforts and medications as indicated. Treatment and medication options discussed during today's visit. Patient ackowledged and verbally consented to continue with current treatment plan and was educated on the importance of compliance with treatment and follow-up appointments.    MEDICATION ISSUES:  1. Bipolar d/o depressed, moderate - cont L, Risperidone, cont wellbutirn at  300 mg -450 mg depending on mood, when hypomaniac - decrease to 300 mg, now on 450 mg   , cont Li 450 mg TID -     Li level therapeutic at 0.7 , 1/3/2022    CMP 1/3/22 glucose 106     Risperidone could cause tremor but per daughter, when off risperidone - increased anger and they preferred to stay on it    The pt was on seroquel - sedation     The pt is on carbidopa-  levodopa  From Dr Saldivar    will refer to Dr Smith , daughter was unhappy with Dr Saldivar office staff     2. Generalied anxiety d/o  - cont lorazepam  , GDR was suggested, daughter expressed concerns about destabilization when he does not sleep well     3. Insomnia - sleep hygiene       The pt [place] now [place] [facility] [facility] [place] [place]     INSPECT reviewed as expected 6/18/22   lorazepam       PHQ scored 10 - moderate depression (due to decreased E)   ANITA 7 scored 9          The pt was stable on current meds, previous GDR attempts failed, the pt was gettng unstable   Long term benzo use discussed with the pt and his daughter , they verbalized understanding   And were willing to continue meds     Discussed medication options and treatment plan of prescribed medication as well as the risks, benefits, and side effects including potential falls, possible impaired driving and metabolic adversities among others. Patient is agreeable to call the office with any worsening of symptoms or onset of side effects. Patient is agreeable to call 911 or go to the nearest ER should he/she begin having SI/HI. No medication side effects or related complaints today.     MEDS ORDERED DURING VISIT:  New Medications Ordered This Visit   Medications   • benztropine (COGENTIN) 1 MG tablet     Sig: Take 1 tablet by mouth 2 (Two) Times a Day.     Dispense:  60 tablet     Refill:  3   • buPROPion XL (WELLBUTRIN XL) 300 MG 24 hr tablet     Sig: Take 1 tablet by mouth Every Morning.     Dispense:  90 tablet     Refill:  1   • lithium (ESKALITH) 450 MG CR tablet     Sig: Take 1 tablet by mouth 3 (Three) Times a Day.     Dispense:  270 tablet     Refill:  1   • risperiDONE (RisperDAL) 2 MG tablet     Sig: Take 1 tablet by mouth Every Night.     Dispense:  90 tablet     Refill:  1   • buPROPion XL (Wellbutrin XL) 150 MG 24 hr tablet     Sig: Take 1 tablet by mouth Every Morning.     Dispense:  90 tablet     Refill:  2   • LORazepam (ATIVAN) 0.5 MG tablet      Sig: Take 3 tablets by mouth Every Night.     Dispense:  90 tablet     Refill:  2       Return in about 4 months (around 11/22/2022).         This document has been electronically signed by Elizabeth Watkins MD  July 22, 2022 12:29 EDT    EMR Dragon transcription disclaimer:  Some of this encounter note is an electronic transcription translation of spoken language to printed text. The electronic translation of spoken language may permit erroneous, or at times, nonsensical words or phrases to be inadvertently transcribed; Although I have reviewed the note for such errors some may still exist.

## 2022-07-29 ENCOUNTER — TELEPHONE (OUTPATIENT)
Dept: PSYCHIATRY | Facility: CLINIC | Age: 72
End: 2022-07-29